# Patient Record
Sex: FEMALE | Race: BLACK OR AFRICAN AMERICAN | NOT HISPANIC OR LATINO | Employment: FULL TIME | URBAN - METROPOLITAN AREA
[De-identification: names, ages, dates, MRNs, and addresses within clinical notes are randomized per-mention and may not be internally consistent; named-entity substitution may affect disease eponyms.]

---

## 2017-01-05 ENCOUNTER — PROCEDURE VISIT (OUTPATIENT)
Dept: OBSTETRICS AND GYNECOLOGY | Facility: CLINIC | Age: 42
End: 2017-01-05
Payer: COMMERCIAL

## 2017-01-05 VITALS — WEIGHT: 207.44 LBS | DIASTOLIC BLOOD PRESSURE: 59 MMHG | SYSTOLIC BLOOD PRESSURE: 110 MMHG | BODY MASS INDEX: 30.2 KG/M2

## 2017-01-05 DIAGNOSIS — R30.0 DYSURIA: Primary | ICD-10-CM

## 2017-01-05 PROCEDURE — 11980 IMPLANT HORMONE PELLET(S): CPT | Mod: S$GLB,,, | Performed by: OBSTETRICS & GYNECOLOGY

## 2017-01-05 PROCEDURE — 87086 URINE CULTURE/COLONY COUNT: CPT

## 2017-01-05 RX ORDER — PHENTERMINE HYDROCHLORIDE 37.5 MG/1
37.5 TABLET ORAL
Qty: 30 TABLET | Refills: 3 | Status: SHIPPED | OUTPATIENT
Start: 2017-01-05 | End: 2017-01-09

## 2017-01-05 NOTE — PROCEDURES
Procedures     Pellet and Injection    Chief Complaint: Hormone Deficiency    History of Present Illness: 41 y.o. female patient presents with menopausal symptoms.    Interval Changes: adequate response to the pellets    Procedure: Subcutaneous hormone pellet implantation with local anesthesia of 10 cc Xylocaine 1%.    Site: Buttocks    Sutures Used: no    Pellets Inserted:  Testosterone 100 mg x 2  Estradiol 50 mg x 2  In the left Buttocks    Intramuscular Injection: None    Follow up with me in 6 months for pellet insertion.    Shannon Fowler MD 01/05/2017 10:08 AM

## 2017-01-05 NOTE — MR AVS SNAPSHOT
[facility] [address]  [address]  [place] [place] [address]  Phone: [phone]  Fax: [phone]                  Bhavana Bright   2017 8:45 AM   Procedure visit    Description:  Female : 1975   Provider:  Shannon Fowler MD   Department:  [facility] [address]           Reason for Visit     Procedure                To Do List           Goals (5 Years of Data)     None      [facility] On Call     [facility] On Call Nurse Care Line -  Assistance  Registered nurses in the [facility] On Call Center provide clinical advisement, health education, appointment booking, and other advisory services.  Call for this free service at [phone].             Medications           Message regarding Medications     Verify the changes and/or additions to your medication regime listed below are the same as discussed with your clinician today.  If any of these changes or additions are incorrect, please notify your healthcare provider.             Verify that the below list of medications is an accurate representation of the medications you are currently taking.  If none reported, the list may be blank. If incorrect, please contact your healthcare provider. Carry this list with you in case of emergency.           Current Medications     estradiol 50 mg pellet Inject 2 Pellet (100 mg total) into the skin as directed.    estradiol 50 mg pellet Inject 2 Pellet (100 mg total) into the skin as directed.    medroxyPROGESTERone (PROVERA) 10 MG tablet Take 1 tablet (10 mg total) by mouth once daily.           Clinical Reference Information           Vital Signs - Last Recorded  Most recent update: 2017  9:18 AM by Roshni Vaughan MA    BP Wt BMI          (!) 110/59 94.1 kg (207 lb 7.3 oz) 30.2 kg/m2        Blood Pressure          Most Recent Value    BP  (!)  110/59      Allergies as of 2017     No Known Allergies      Immunizations Administered on Date of Encounter - 2017     None

## 2017-01-06 LAB
BACTERIA UR CULT: NORMAL
BACTERIA UR CULT: NORMAL

## 2017-01-09 ENCOUNTER — TELEPHONE (OUTPATIENT)
Dept: OBSTETRICS AND GYNECOLOGY | Facility: CLINIC | Age: 42
End: 2017-01-09

## 2017-01-09 RX ORDER — PHENTERMINE HYDROCHLORIDE 37.5 MG/1
37.5 TABLET ORAL
Qty: 30 TABLET | Refills: 3 | Status: SHIPPED | OUTPATIENT
Start: 2017-01-09 | End: 2017-02-08

## 2017-03-09 RX ORDER — MEDROXYPROGESTERONE ACETATE 10 MG/1
TABLET ORAL
Qty: 30 TABLET | Refills: 11 | Status: SHIPPED | OUTPATIENT
Start: 2017-03-09 | End: 2018-03-30 | Stop reason: SDUPTHER

## 2017-05-08 ENCOUNTER — TELEPHONE (OUTPATIENT)
Dept: OBSTETRICS AND GYNECOLOGY | Facility: CLINIC | Age: 42
End: 2017-05-08

## 2017-05-08 NOTE — TELEPHONE ENCOUNTER
----- Message from Emerald Thompson sent at 5/8/2017 11:19 AM CDT -----  Contact: Self  Pt is calling in regards of scheduling a pellet appt. The pt can be reached at [phone]. Thanks KG

## 2017-05-15 ENCOUNTER — TELEPHONE (OUTPATIENT)
Dept: OBSTETRICS AND GYNECOLOGY | Facility: CLINIC | Age: 42
End: 2017-05-15

## 2017-05-15 NOTE — TELEPHONE ENCOUNTER
----- Message from Arina Oconnor sent at 5/15/2017 11:24 AM CDT -----  Contact: Patient  X _1st Request  _  2nd Request  _  3rd Request    Who:DEVIN MCDANIEL [[id number]]    Why:Patient states the pharmacy that was supposed to call her has not called her for pellets and needed to speak with someone     What Number to Call Back:[phone]    When to Expect a call back: (Before the end of the day)   -- if call after 3:00 call back will be tomorrow.

## 2017-05-15 NOTE — TELEPHONE ENCOUNTER
Patient has not been contacted by pharmacy, she has canceled appointment today for insertion.  Patient would like to discuss Pellets and if she can contact pharmacy, directly. Informed will forward message to Dr. Fowler's MA, to assist in obtaining prescription.

## 2017-05-16 NOTE — TELEPHONE ENCOUNTER
Spoke with patient, patient stated that she couldn't come in on June 8th. Patient stated that she can do any other Tuesday or Thursday.I offered her Tuesday 6/27 @ 330 patient denied that time and stated that she needed a late morning appointment. I stated that i will forward this message to ayo to she if she can work her in patient verbalized understanding.

## 2017-05-16 NOTE — TELEPHONE ENCOUNTER
----- Message from Emerald Thompson sent at 5/16/2017 11:24 AM CDT -----  Contact: Self  Pt states that she is unavailable June 8th for her pellet procedure. The pt can be reached at [phone]. The pt can be reached at [phone]. Thanks KG

## 2017-06-27 ENCOUNTER — PROCEDURE VISIT (OUTPATIENT)
Dept: OBSTETRICS AND GYNECOLOGY | Facility: CLINIC | Age: 42
End: 2017-06-27
Payer: COMMERCIAL

## 2017-06-27 VITALS
HEIGHT: 70 IN | SYSTOLIC BLOOD PRESSURE: 112 MMHG | DIASTOLIC BLOOD PRESSURE: 60 MMHG | WEIGHT: 196.44 LBS | BODY MASS INDEX: 28.12 KG/M2

## 2017-06-27 DIAGNOSIS — N95.1 SYMPTOMATIC MENOPAUSAL OR FEMALE CLIMACTERIC STATES: Primary | ICD-10-CM

## 2017-06-27 PROCEDURE — 11980 IMPLANT HORMONE PELLET(S): CPT | Mod: S$GLB,,, | Performed by: OBSTETRICS & GYNECOLOGY

## 2017-06-27 NOTE — PROCEDURES
Procedures     Pellet and Injection    Chief Complaint: Hormone Deficiency    History of Present Illness: 41 y.o. female patient presents with menopausal symptoms.    Interval Changes: adequate response to the pellets    Procedure: Subcutaneous hormone pellet implantation with local anesthesia of 10 cc Xylocaine 1%.    Site: Buttocks    Sutures Used: yes    Pellets Inserted:  Testosterone 100 mg x 2  Estradiol 50 mg x 2  In the left Buttocks    Intramuscular Injection: None    Follow up with me in 6 months for pellet insertion.    Shannon Fowler MD 06/27/2017 11:47 AM

## 2017-09-18 ENCOUNTER — TELEPHONE (OUTPATIENT)
Dept: OBSTETRICS AND GYNECOLOGY | Facility: CLINIC | Age: 42
End: 2017-09-18

## 2017-09-18 DIAGNOSIS — Z12.31 VISIT FOR SCREENING MAMMOGRAM: Primary | ICD-10-CM

## 2017-09-18 NOTE — TELEPHONE ENCOUNTER
----- Message from Cory Guerrero sent at 9/18/2017  3:48 PM CDT -----  Contact: pt  x_ 1st Request   _ 2nd Request   _ 3rd Request     Who: DEVIN MCDANIEL [[id number]]    Why: pt called is requesting orders for mammogram be placed in the system for her.    What Number to Call Back: [phone]    When to Expect a call back: (Before the end of the day)   -- if call after 3:00 call back will be tomorrow.

## 2017-10-03 ENCOUNTER — HOSPITAL ENCOUNTER (OUTPATIENT)
Dept: RADIOLOGY | Facility: OTHER | Age: 42
Discharge: HOME | End: 2017-10-03
Attending: OBSTETRICS & GYNECOLOGY
Payer: COMMERCIAL

## 2017-10-03 DIAGNOSIS — Z12.31 VISIT FOR SCREENING MAMMOGRAM: ICD-10-CM

## 2017-10-03 PROCEDURE — 77067 SCR MAMMO BI INCL CAD: CPT | Mod: TC

## 2017-10-03 PROCEDURE — 77063 BREAST TOMOSYNTHESIS BI: CPT | Mod: 26,,, | Performed by: RADIOLOGY

## 2017-10-03 PROCEDURE — 77067 SCR MAMMO BI INCL CAD: CPT | Mod: 26,,, | Performed by: RADIOLOGY

## 2017-11-06 ENCOUNTER — TELEPHONE (OUTPATIENT)
Dept: OBSTETRICS AND GYNECOLOGY | Facility: CLINIC | Age: 42
End: 2017-11-06

## 2017-11-06 NOTE — TELEPHONE ENCOUNTER
----- Message from Karen Villalta sent at 11/6/2017  2:29 PM CST -----  Contact: self  _x  1st Request  _  2nd Request  _  3rd Request    Who: pt    Why: pt returning call.. Please advise    What Number to Call Back: [phone]  When to Expect a call back: (Before the end of the day)   -- if call after 3:00 call back will be tomorrow.

## 2017-11-06 NOTE — TELEPHONE ENCOUNTER
----- Message from Annette Tovar sent at 11/6/2017 12:24 PM CST -----  Contact: DEVIN MCDANIEL [[id number]]  x_  1st Request  _  2nd Request  _  3rd Request        Who: DEVIN MCDANIEL [[id number]]    Why: Requesting a call back in regards to scheduling a procedure. Please call her.     What Number to Call Back: [phone]    When to Expect a call back: (Within 24 hours)    Please return the call at earliest convenience. Thanks!

## 2017-11-22 ENCOUNTER — OFFICE VISIT (OUTPATIENT)
Dept: OPTOMETRY | Facility: CLINIC | Age: 42
End: 2017-11-22
Payer: COMMERCIAL

## 2017-11-22 DIAGNOSIS — H52.7 REFRACTIVE ERROR: ICD-10-CM

## 2017-11-22 DIAGNOSIS — Z01.00 EXAMINATION OF EYES AND VISION: Primary | ICD-10-CM

## 2017-11-22 PROCEDURE — 92015 DETERMINE REFRACTIVE STATE: CPT | Mod: S$GLB,,, | Performed by: OPTOMETRIST

## 2017-11-22 PROCEDURE — 99999 PR PBB SHADOW E&M-EST. PATIENT-LVL I: CPT | Mod: PBBFAC,,, | Performed by: OPTOMETRIST

## 2017-11-22 PROCEDURE — 92004 COMPRE OPH EXAM NEW PT 1/>: CPT | Mod: S$GLB,,, | Performed by: OPTOMETRIST

## 2017-11-22 NOTE — PROGRESS NOTES
Subjective:       Patient ID: Bhavana Bright is a 42 y.o. female      Chief Complaint   Patient presents with    Concerns About Ocular Health     History of Present Illness  Dls: ? 4 yrs     Pt c/o blurry vision at near ou. Pt does not wear any glasses. Pt states   no tearing no itching no burning no pain no ha's no floaters.     Eye meds:  None     Assessment/Plan:     1. Examination of eyes and vision  Good ocular health OU    2. Refractive error  Educated patient on refractive error and discussed lens options. Can try OTC readers if preferred. Dispensed updated spectacle Rx. Educated about adaptation period to new specs.    Eyeglass Final Rx     Eyeglass Final Rx       Sphere Cylinder    Right +1.50 Sphere    Left +1.50 Sphere    Type:  SVL near only    Expiration Date:  11/23/2018                  No Follow-up on file.

## 2017-11-28 ENCOUNTER — TELEPHONE (OUTPATIENT)
Dept: OBSTETRICS AND GYNECOLOGY | Facility: CLINIC | Age: 42
End: 2017-11-28

## 2017-11-28 NOTE — TELEPHONE ENCOUNTER
----- Message from Marga Silveira sent at 11/28/2017  8:45 AM CST -----  Contact: self  Pt needing a call back, regarding her appt Friday, she can be reached at [phone].

## 2017-12-01 ENCOUNTER — TELEPHONE (OUTPATIENT)
Dept: OBSTETRICS AND GYNECOLOGY | Facility: CLINIC | Age: 42
End: 2017-12-01

## 2017-12-01 NOTE — TELEPHONE ENCOUNTER
----- Message from Ayshatushar Johnson sent at 12/1/2017  9:09 AM CST -----  Contact: DEVIN MCDANIEL [[id number]]  x_  1st Request  _  2nd Request  _  3rd Request        Who: DEVIN MCDANIEL [[id number]]    Why: patient states her pellets did not come in and would like a call back to reschedule her procedure    What Number to Call Back: [phone]    When to Expect a call back: (Before the end of the day)   -- if call after 3:00 call back will be tomorrow.

## 2017-12-01 NOTE — TELEPHONE ENCOUNTER
Pt spoke to pellet pharmacy and got info for tracking and was unable to get pellets today as something happened in [place] and they became delayed. Rescheduled to Tuesday morning.

## 2017-12-05 ENCOUNTER — PROCEDURE VISIT (OUTPATIENT)
Dept: OBSTETRICS AND GYNECOLOGY | Facility: CLINIC | Age: 42
End: 2017-12-05
Payer: COMMERCIAL

## 2017-12-05 VITALS — HEIGHT: 70 IN | BODY MASS INDEX: 28.8 KG/M2 | WEIGHT: 201.19 LBS

## 2017-12-05 DIAGNOSIS — N95.1 SYMPTOMATIC MENOPAUSAL OR FEMALE CLIMACTERIC STATES: Primary | ICD-10-CM

## 2017-12-05 PROCEDURE — 11980 IMPLANT HORMONE PELLET(S): CPT | Mod: S$GLB,,, | Performed by: OBSTETRICS & GYNECOLOGY

## 2017-12-05 NOTE — PROCEDURES
Procedures     Pellet and Injection    Chief Complaint: Hormone Deficiency    History of Present Illness: 42 y.o. female patient presents with menopausal symptoms.    Interval Changes: adequate response to the pellets    Procedure: Subcutaneous hormone pellet implantation with local anesthesia of 10 cc Xylocaine 1%.    Site: Buttocks    Sutures Used: yes    Pellets Inserted:  Testosterone 100 mg x 2  Estradiol 50 mg x 2  In the left Buttocks    Intramuscular Injection: None    Follow up with me in 6 months for pellet insertion.    Shannon Fowler MD 12/05/2017 12:00 PM

## 2018-01-03 ENCOUNTER — TELEPHONE (OUTPATIENT)
Dept: OBSTETRICS AND GYNECOLOGY | Facility: CLINIC | Age: 43
End: 2018-01-03

## 2018-01-03 DIAGNOSIS — E28.39 PREMATURE OVARIAN FAILURE: Primary | ICD-10-CM

## 2018-04-03 RX ORDER — MEDROXYPROGESTERONE ACETATE 10 MG/1
TABLET ORAL
Qty: 90 TABLET | Refills: 3 | Status: SHIPPED | OUTPATIENT
Start: 2018-04-03 | End: 2019-06-14 | Stop reason: SDUPTHER

## 2018-04-16 ENCOUNTER — TELEPHONE (OUTPATIENT)
Dept: OBSTETRICS AND GYNECOLOGY | Facility: CLINIC | Age: 43
End: 2018-04-16

## 2018-04-16 DIAGNOSIS — R53.83 FATIGUE, UNSPECIFIED TYPE: Primary | ICD-10-CM

## 2018-04-16 NOTE — TELEPHONE ENCOUNTER
----- Message from Jey Olivo MA sent at 4/16/2018 11:12 AM CDT -----  Contact: Self  Pt states that she is needing a appt for pellets.  The pt can be reached at [phone].  Thanks FL

## 2018-04-17 ENCOUNTER — LAB VISIT (OUTPATIENT)
Dept: LAB | Facility: OTHER | Age: 43
End: 2018-04-17
Attending: OBSTETRICS & GYNECOLOGY
Payer: COMMERCIAL

## 2018-04-17 DIAGNOSIS — R53.83 FATIGUE, UNSPECIFIED TYPE: ICD-10-CM

## 2018-04-17 LAB
ESTRADIOL SERPL-MCNC: 196 PG/ML
TESTOST SERPL-MCNC: 53 NG/DL

## 2018-04-17 PROCEDURE — 36415 COLL VENOUS BLD VENIPUNCTURE: CPT

## 2018-04-17 PROCEDURE — 82670 ASSAY OF TOTAL ESTRADIOL: CPT

## 2018-04-17 PROCEDURE — 84402 ASSAY OF FREE TESTOSTERONE: CPT

## 2018-04-17 PROCEDURE — 84403 ASSAY OF TOTAL TESTOSTERONE: CPT

## 2018-04-20 LAB — TESTOST FREE SERPL-MCNC: 1.3 PG/ML

## 2018-04-23 ENCOUNTER — TELEPHONE (OUTPATIENT)
Dept: OBSTETRICS AND GYNECOLOGY | Facility: CLINIC | Age: 43
End: 2018-04-23

## 2018-04-23 DIAGNOSIS — R53.83 FATIGUE, UNSPECIFIED TYPE: Primary | ICD-10-CM

## 2018-04-23 NOTE — TELEPHONE ENCOUNTER
----- Message from Pamela Phipps sent at 4/23/2018  1:53 PM CDT -----  Contact: DEVIN MCDANIEL [[id number]]            Name of Who is Calling: DEVIN MCDANIEL [[id number]]      What is the request in detail: calling in regards to test results and moving forward with pellet inj, pt requesting call back       Can the clinic reply by [facility]: no      [facility]: [phone]

## 2018-09-24 ENCOUNTER — TELEPHONE (OUTPATIENT)
Dept: OBSTETRICS AND GYNECOLOGY | Facility: CLINIC | Age: 43
End: 2018-09-24

## 2018-09-24 DIAGNOSIS — Z12.31 VISIT FOR SCREENING MAMMOGRAM: Primary | ICD-10-CM

## 2018-09-24 NOTE — TELEPHONE ENCOUNTER
----- Message from Arina Oconnor sent at 9/24/2018  2:58 PM CDT -----  Contact: jodee  Name of Who is Calling: jodee      What is the request in detail: Patient is requesting orders for her annual mammogram       Can the clinic reply by [facility]: no      What Number to Call Back if not in [facility]: [phone]

## 2018-10-01 ENCOUNTER — TELEPHONE (OUTPATIENT)
Dept: OBSTETRICS AND GYNECOLOGY | Facility: CLINIC | Age: 43
End: 2018-10-01

## 2018-10-01 NOTE — TELEPHONE ENCOUNTER
----- Message from Sarah Medina sent at 10/1/2018 12:09 PM CDT -----  Contact: Pt   Name of Who is Calling:  DEVIN MCDANIEL [[id number]]    What is the request in detail: Pt would like to speak to the nurse in regards to scheduling Pallet procedure. Please advise.    Can the clinic reply by [facility]: No     What Number to Call Back if not in [facility]: [phone]

## 2018-10-09 ENCOUNTER — HOSPITAL ENCOUNTER (OUTPATIENT)
Dept: RADIOLOGY | Facility: OTHER | Age: 43
Discharge: HOME | End: 2018-10-09
Attending: OBSTETRICS & GYNECOLOGY
Payer: COMMERCIAL

## 2018-10-09 DIAGNOSIS — Z12.31 VISIT FOR SCREENING MAMMOGRAM: ICD-10-CM

## 2018-10-09 PROCEDURE — 77067 SCR MAMMO BI INCL CAD: CPT | Mod: 26,,, | Performed by: RADIOLOGY

## 2018-10-09 PROCEDURE — 77063 BREAST TOMOSYNTHESIS BI: CPT | Mod: TC

## 2018-10-09 PROCEDURE — 77067 SCR MAMMO BI INCL CAD: CPT | Mod: TC

## 2018-10-09 PROCEDURE — 77063 BREAST TOMOSYNTHESIS BI: CPT | Mod: 26,,, | Performed by: RADIOLOGY

## 2018-10-15 ENCOUNTER — TELEPHONE (OUTPATIENT)
Dept: OBSTETRICS AND GYNECOLOGY | Facility: CLINIC | Age: 43
End: 2018-10-15

## 2018-10-15 NOTE — TELEPHONE ENCOUNTER
----- Message from Faheem Olmstead sent at 10/15/2018  8:20 AM CDT -----  Contact: Bhavana Bright            Name of Who is Calling: Bhavana Bright      What is the request in detail: Patient needs to schedule pellet procedure      Can the clinic reply by [facility]: No      What Number to Call Back if not in [facility]: [phone]

## 2018-11-06 ENCOUNTER — PROCEDURE VISIT (OUTPATIENT)
Dept: OBSTETRICS AND GYNECOLOGY | Facility: CLINIC | Age: 43
End: 2018-11-06

## 2018-11-06 DIAGNOSIS — M85.80 OSTEOPENIA, UNSPECIFIED LOCATION: ICD-10-CM

## 2018-11-06 DIAGNOSIS — E28.39 PREMATURE OVARIAN FAILURE: ICD-10-CM

## 2018-11-06 PROCEDURE — 11980 IMPLANT HORMONE PELLET(S): CPT | Mod: CSM,S$GLB,, | Performed by: OBSTETRICS & GYNECOLOGY

## 2018-11-06 NOTE — PROCEDURES
Pellet and Injection    Chief Complaint: Hormone Deficiency    History of Present Illness: 43 y.o. female patient presents with menopausal symptoms.    Interval Changes: adequate response to the pellets    Procedure: Subcutaneous hormone pellet implantation with local anesthesia of 10 cc Xylocaine 1%.    Site: Buttocks    Sutures Used: yes    Pellets Inserted:  Testosterone 100 mg x 2  Estradiol 50 mg x 2  In the right Buttocks    Intramuscular Injection: None    Follow up with me in 6 months for pellet insertion.    Shannon Fowler MD 11/06/2018 8:38 AM

## 2018-12-26 ENCOUNTER — TELEPHONE (OUTPATIENT)
Dept: GASTROENTEROLOGY | Facility: CLINIC | Age: 43
End: 2018-12-26

## 2018-12-26 NOTE — TELEPHONE ENCOUNTER
Attempted to contact patient about scheduling an appointment. Left patient a message to give office a call back.

## 2018-12-26 NOTE — TELEPHONE ENCOUNTER
----- Message from Farrah Pineda sent at 12/24/2018 11:55 AM CST -----  Contact: self [phone]  New patient would like to speak with you about scheduling an appointment for stomach pain. Please advise.

## 2019-02-25 ENCOUNTER — TELEPHONE (OUTPATIENT)
Dept: OBSTETRICS AND GYNECOLOGY | Facility: CLINIC | Age: 44
End: 2019-02-25

## 2019-02-25 DIAGNOSIS — N95.1 MENOPAUSAL SYMPTOM: Primary | ICD-10-CM

## 2019-02-25 NOTE — TELEPHONE ENCOUNTER
----- Message from Brit Celaya sent at 2/25/2019  2:37 PM CST -----  Contact: Patient   Patient called to schedule annual exam and pellet procedure. The patient can be reached at [phone].

## 2019-02-25 NOTE — TELEPHONE ENCOUNTER
----- Message from Brit Celaya sent at 2/25/2019  2:37 PM CST -----  Contact: Patient   Patient called to schedule annual exam and pellet procedure. The patient can be reached at [phone].

## 2019-04-04 ENCOUNTER — OFFICE VISIT (OUTPATIENT)
Dept: OBSTETRICS AND GYNECOLOGY | Facility: CLINIC | Age: 44
End: 2019-04-04
Payer: COMMERCIAL

## 2019-04-04 ENCOUNTER — LAB VISIT (OUTPATIENT)
Dept: LAB | Facility: OTHER | Age: 44
End: 2019-04-04
Attending: OBSTETRICS & GYNECOLOGY
Payer: COMMERCIAL

## 2019-04-04 VITALS
WEIGHT: 215.19 LBS | HEIGHT: 70 IN | DIASTOLIC BLOOD PRESSURE: 60 MMHG | SYSTOLIC BLOOD PRESSURE: 116 MMHG | BODY MASS INDEX: 30.81 KG/M2

## 2019-04-04 DIAGNOSIS — Z12.4 PAP SMEAR FOR CERVICAL CANCER SCREENING: ICD-10-CM

## 2019-04-04 DIAGNOSIS — Z11.51 SCREENING FOR HPV (HUMAN PAPILLOMAVIRUS): ICD-10-CM

## 2019-04-04 DIAGNOSIS — Z01.419 ENCOUNTER FOR GYNECOLOGICAL EXAMINATION: Primary | ICD-10-CM

## 2019-04-04 DIAGNOSIS — Z12.11 SCREENING FOR COLORECTAL CANCER: ICD-10-CM

## 2019-04-04 DIAGNOSIS — N95.1 MENOPAUSAL SYMPTOM: ICD-10-CM

## 2019-04-04 DIAGNOSIS — Z12.12 SCREENING FOR COLORECTAL CANCER: ICD-10-CM

## 2019-04-04 LAB
ESTRADIOL SERPL-MCNC: 161 PG/ML
TESTOST SERPL-MCNC: 19 NG/DL (ref 5–73)

## 2019-04-04 PROCEDURE — 87624 HPV HI-RISK TYP POOLED RSLT: CPT

## 2019-04-04 PROCEDURE — 99386 PREV VISIT NEW AGE 40-64: CPT | Mod: S$GLB,,, | Performed by: OBSTETRICS & GYNECOLOGY

## 2019-04-04 PROCEDURE — 82670 ASSAY OF TOTAL ESTRADIOL: CPT

## 2019-04-04 PROCEDURE — 99386 PR PREVENTIVE VISIT,NEW,40-64: ICD-10-PCS | Mod: S$GLB,,, | Performed by: OBSTETRICS & GYNECOLOGY

## 2019-04-04 PROCEDURE — 84402 ASSAY OF FREE TESTOSTERONE: CPT

## 2019-04-04 PROCEDURE — 99999 PR PBB SHADOW E&M-EST. PATIENT-LVL III: ICD-10-PCS | Mod: PBBFAC,,, | Performed by: OBSTETRICS & GYNECOLOGY

## 2019-04-04 PROCEDURE — 99999 PR PBB SHADOW E&M-EST. PATIENT-LVL III: CPT | Mod: PBBFAC,,, | Performed by: OBSTETRICS & GYNECOLOGY

## 2019-04-04 PROCEDURE — 84403 ASSAY OF TOTAL TESTOSTERONE: CPT

## 2019-04-04 PROCEDURE — 88175 CYTOPATH C/V AUTO FLUID REDO: CPT

## 2019-04-04 NOTE — PROGRESS NOTES
HPI: Pt is a 43 y.o.  female who presents for routine annual exam. She has POF and is on pellet therapy for hormone replacement.      MMG 10/19/18 NORMAL.       ROS:  GENERAL: Feeling well overall. Denies fever or chills.   SKIN: Denies rash or lesions.   HEAD: Denies head injury or headache.   NODES: Denies enlarged lymph nodes.   CHEST: Denies chest pain or shortness of breath.   CARDIOVASCULAR: Denies palpitations or left sided chest pain.   ABDOMEN: No abdominal pain, constipation, diarrhea, nausea, vomiting or rectal bleeding.   URINARY: No dysuria, hematuria, or burning on urination.  REPRODUCTIVE: See HPI.   BREASTS: Denies pain, lumps, or nipple discharge.   HEMATOLOGIC: No easy bruisability or excessive bleeding.   MUSCULOSKELETAL: Denies joint pain or swelling.   NEUROLOGIC: Denies syncope or weakness.   PSYCHIATRIC: Denies depression, anxiety or mood swings.    PE:   APPEARANCE: Well nourished, well developed, Black or  female in no acute distress.  NODES: no cervical, supraclavicular, or inguinal lymphadenopathy  BREASTS: Symmetrical, no skin changes or visible lesions. No palpable masses, nipple discharge or adenopathy bilaterally.  ABDOMEN: Soft. No tenderness or masses. No distention. No hernias palpated. No CVA tenderness.  VULVA: No lesions. Normal external female genitalia.  URETHRAL MEATUS: Normal size and location, no lesions, no prolapse.  URETHRA: No masses, tenderness, or prolapse.  VAGINA: Moist. No lesions or lacerations noted. No abnormal discharge present. No odor present.   CERVIX: No lesions or discharge. No cervical motion tenderness.   UTERUS: Normal size, regular shape, mobile, non-tender.  ADNEXA: No tenderness. No fullness or masses palpated in the adnexal regions.   ANUS PERINEUM: Normal.      Diagnosis:  1. Encounter for gynecological examination    2. Pap smear for cervical cancer screening    3. Screening for colorectal cancer    4. Screening for HPV (human  papillomavirus)        Plan:     Orders Placed This Encounter    HPV High Risk Genotypes, PCR    Liquid-based pap smear, screening    Case request GI: COLONOSCOPY       Patient was counseled today on the new ACS guidelines for cervical cytology screening as well as the current recommendations for breast cancer screening. She was counseled to follow up with her PCP for other routine health maintenance. Counseling session lasted approximately 10 minutes, and all her questions were answered.    Follow-up with me in 1 year for routine exam; cotesting in 5 years if today's normal.

## 2019-04-08 DIAGNOSIS — Z12.11 SPECIAL SCREENING FOR MALIGNANT NEOPLASMS, COLON: Primary | ICD-10-CM

## 2019-04-08 LAB — TESTOST FREE SERPL-MCNC: 1 PG/ML

## 2019-04-08 RX ORDER — POLYETHYLENE GLYCOL 3350, SODIUM SULFATE ANHYDROUS, SODIUM BICARBONATE, SODIUM CHLORIDE, POTASSIUM CHLORIDE 236; 22.74; 6.74; 5.86; 2.97 G/4L; G/4L; G/4L; G/4L; G/4L
4 POWDER, FOR SOLUTION ORAL ONCE
Qty: 4000 ML | Refills: 0 | Status: SHIPPED | OUTPATIENT
Start: 2019-04-08 | End: 2019-04-08

## 2019-04-10 LAB
HPV HR 12 DNA CVX QL NAA+PROBE: NEGATIVE
HPV16 AG SPEC QL: POSITIVE
HPV18 DNA SPEC QL NAA+PROBE: NEGATIVE

## 2019-04-16 ENCOUNTER — PATIENT MESSAGE (OUTPATIENT)
Dept: OBSTETRICS AND GYNECOLOGY | Facility: CLINIC | Age: 44
End: 2019-04-16

## 2019-05-07 ENCOUNTER — PROCEDURE VISIT (OUTPATIENT)
Dept: OBSTETRICS AND GYNECOLOGY | Facility: CLINIC | Age: 44
End: 2019-05-07

## 2019-05-07 DIAGNOSIS — N95.1 SYMPTOMATIC MENOPAUSAL OR FEMALE CLIMACTERIC STATES: Primary | ICD-10-CM

## 2019-05-07 PROCEDURE — 11980 PR IMPLANT,HORMONE,SUBCUTANEOUS: ICD-10-PCS | Mod: CSM,S$GLB,, | Performed by: OBSTETRICS & GYNECOLOGY

## 2019-05-07 PROCEDURE — 11980 IMPLANT HORMONE PELLET(S): CPT | Mod: CSM,S$GLB,, | Performed by: OBSTETRICS & GYNECOLOGY

## 2019-05-07 RX ORDER — PHENTERMINE HYDROCHLORIDE 37.5 MG/1
37.5 TABLET ORAL
Qty: 30 TABLET | Refills: 3 | Status: SHIPPED | OUTPATIENT
Start: 2019-05-07 | End: 2019-06-06

## 2019-05-07 NOTE — PROCEDURES
Procedures     Pellet and Injection    Chief Complaint: Hormone Deficiency    History of Present Illness: 43 y.o. female patient presents with menopausal symptoms.    Interval Changes: adequate response to the pellets    Procedure: Subcutaneous hormone pellet implantation with local anesthesia of 10 cc Xylocaine 1%.    Site: Buttocks    Sutures Used: yes    Pellets Inserted:  Testosterone 100 mg x 2  Estradiol 50 mg x 2  In the left Buttocks    Intramuscular Injection: None    Follow up with me in 6 months for pellet insertion.    Shannon Fowler MD 05/07/2019 8:46 AM

## 2019-05-08 ENCOUNTER — TELEPHONE (OUTPATIENT)
Dept: SURGERY | Facility: CLINIC | Age: 44
End: 2019-05-08

## 2019-05-08 NOTE — TELEPHONE ENCOUNTER
----- Message from Aleah Marcelino sent at 5/8/2019  9:09 AM CDT -----  Contact: Patient   Patient would like to cancel her procedure that is on 05/10/2019    [phone]    Thank you

## 2019-06-06 ENCOUNTER — PROCEDURE VISIT (OUTPATIENT)
Dept: OBSTETRICS AND GYNECOLOGY | Facility: CLINIC | Age: 44
End: 2019-06-06
Payer: COMMERCIAL

## 2019-06-06 VITALS — HEIGHT: 70 IN | WEIGHT: 215 LBS | BODY MASS INDEX: 30.78 KG/M2

## 2019-06-06 DIAGNOSIS — R87.810 CERVICAL HIGH RISK HPV (HUMAN PAPILLOMAVIRUS) TEST POSITIVE: Primary | ICD-10-CM

## 2019-06-06 PROCEDURE — 57454 BX/CURETT OF CERVIX W/SCOPE: CPT | Mod: S$GLB,,, | Performed by: OBSTETRICS & GYNECOLOGY

## 2019-06-06 PROCEDURE — 57454 PR COLPOSC,CERVIX W/ADJ VAG,W/BX & CURRETAG: ICD-10-PCS | Mod: S$GLB,,, | Performed by: OBSTETRICS & GYNECOLOGY

## 2019-06-06 NOTE — PROCEDURES
Procedures     COLPOSCOPY:    Bhavana Bright is a 43 y.o. female presents today for colposcopy.  No LMP recorded. Patient has had an implant..  Her most recent pap smear shows NILM, (+) HPV 18.      The abnormal test findings were discussed, as well as HPV infection, need for colposcopy and possible biopsies to determine the plan of care, treatments available, the minimal risk of bleeding and infection with colposcopy, and alternatives to colposcopy and she agrees to proceed.      UPT is negative    COLPOSCOPY EXAM:   TIME OUT PERFORMED.     no visible lesions    Biopsy was NOT taken    ECC was not performed      Hemostasis was adequate with application of Monsel's solution.  The speculum was removed.    The patient did tolerate the procedure well.    Impression: normal    All collected specimens sent to pathology for histologic analysis.    Post-colposcopy counseling:  The patient was instructed to manage post-colposcopy cramping with NSAIDs or Tylenol, or with a prescription per the medication card.  Avoid intercourse, douching, or tampons in the vagina for at least 2-3 days.  Expect a clumpy blackish discharge due to Monsel's solution application for several days.  Report heavy bleeding, worsening pain or pain that does not respond to above medications, or foul-smelling vaginal discharge.     HPV vaccine recommended according to FDA age guidelines.    Follow up in 6 months for repeat pap smear or sooner based on colposcopy results.

## 2019-06-14 RX ORDER — MEDROXYPROGESTERONE ACETATE 10 MG/1
TABLET ORAL
Qty: 90 TABLET | Refills: 0 | Status: SHIPPED | OUTPATIENT
Start: 2019-06-14 | End: 2019-09-09 | Stop reason: SDUPTHER

## 2019-09-09 RX ORDER — MEDROXYPROGESTERONE ACETATE 10 MG/1
TABLET ORAL
Qty: 90 TABLET | Refills: 0 | Status: SHIPPED | OUTPATIENT
Start: 2019-09-09 | End: 2019-12-06 | Stop reason: SDUPTHER

## 2019-09-20 ENCOUNTER — TELEPHONE (OUTPATIENT)
Dept: OBSTETRICS AND GYNECOLOGY | Facility: CLINIC | Age: 44
End: 2019-09-20

## 2019-09-20 NOTE — TELEPHONE ENCOUNTER
Spoke with patient whom stated she was last seen in June. She complained of bleeding and was told if this continues to call for appointment (possible surgery). I attempted to schedule for pre op (unsuccessful). Informed her staff will call on Monday for scheduling.

## 2019-09-20 NOTE — TELEPHONE ENCOUNTER
----- Message from Belinda Smith sent at 9/20/2019  8:44 AM CDT -----  Contact: DEVIN MCDANIEL [[id number]]  Name of Who is Calling: DEVIN MCDANIEL [[id number]]    What is the request in detail: Would like to speak with provider in regards to irregular bleeding. Please contact to further discuss and advise      Can the clinic reply by [facility]: no    [facility]: [phone]

## 2019-09-25 ENCOUNTER — TELEPHONE (OUTPATIENT)
Dept: OBSTETRICS AND GYNECOLOGY | Facility: CLINIC | Age: 44
End: 2019-09-25

## 2019-09-25 NOTE — TELEPHONE ENCOUNTER
----- Message from Brit Celaya sent at 9/25/2019  8:11 AM CDT -----  Contact: Patient  Patient called as a follow-up to her previous calls regarding intermittent bleeding for the past and a half.The patient was advised by the provider if this was to occur again to call and inform her of it so that she can address the issue accordingly. The patient can be reached at  [phone].

## 2019-09-25 NOTE — TELEPHONE ENCOUNTER
Pt states that she was told to call as Dr Fowler wanted to book a hyst if the bleeding continued and she states that it has and she is wanting to talk about it more. Pt booked on 10/3 for consult

## 2019-10-03 ENCOUNTER — OFFICE VISIT (OUTPATIENT)
Dept: OBSTETRICS AND GYNECOLOGY | Facility: CLINIC | Age: 44
End: 2019-10-03
Payer: COMMERCIAL

## 2019-10-03 DIAGNOSIS — N95.1 SYMPTOMATIC MENOPAUSAL OR FEMALE CLIMACTERIC STATES: ICD-10-CM

## 2019-10-03 DIAGNOSIS — R10.2 PELVIC PAIN IN FEMALE: Primary | ICD-10-CM

## 2019-10-03 DIAGNOSIS — Z12.39 BREAST CANCER SCREENING: ICD-10-CM

## 2019-10-03 DIAGNOSIS — Z13.220 SCREENING CHOLESTEROL LEVEL: ICD-10-CM

## 2019-10-03 DIAGNOSIS — D25.1 INTRAMURAL LEIOMYOMA OF UTERUS: ICD-10-CM

## 2019-10-03 PROCEDURE — 99999 PR PBB SHADOW E&M-EST. PATIENT-LVL II: CPT | Mod: PBBFAC,,, | Performed by: OBSTETRICS & GYNECOLOGY

## 2019-10-03 PROCEDURE — 99999 PR PBB SHADOW E&M-EST. PATIENT-LVL II: ICD-10-PCS | Mod: PBBFAC,,, | Performed by: OBSTETRICS & GYNECOLOGY

## 2019-10-03 PROCEDURE — 99213 PR OFFICE/OUTPT VISIT, EST, LEVL III, 20-29 MIN: ICD-10-PCS | Mod: S$GLB,,, | Performed by: OBSTETRICS & GYNECOLOGY

## 2019-10-03 PROCEDURE — 99213 OFFICE O/P EST LOW 20 MIN: CPT | Mod: S$GLB,,, | Performed by: OBSTETRICS & GYNECOLOGY

## 2019-10-03 NOTE — PROGRESS NOTES
HPI: Pt presents today to discuss surgical options for pelvic pain and continued vaginal bleeding. She has hx of premature ovarian failure and has been on HRT with estradiol/testosterone injections and doing well. She takes progesterone orally. Still reports having a lot of spotting and lower abdominal pain and cramping. She is interested in surgical management as she does not want to have to worry about bleeding all the time with her hormones.     ROS:  GENERAL: Feeling well overall. Denies fever or chills.   SKIN: Denies rash or lesions.   HEAD: Denies head injury or headache.   NODES: Denies enlarged lymph nodes.   CHEST: Denies chest pain or shortness of breath.   CARDIOVASCULAR: Denies palpitations or left sided chest pain.   ABDOMEN: No abdominal pain, constipation, diarrhea, nausea, vomiting or rectal bleeding.   URINARY: No dysuria, hematuria, or burning on urination.  REPRODUCTIVE: See HPI.   BREASTS: Denies pain, lumps, or nipple discharge.   HEMATOLOGIC: No easy bruisability or excessive bleeding.   MUSCULOSKELETAL: Denies joint pain or swelling.   NEUROLOGIC: Denies syncope or weakness.   PSYCHIATRIC: Denies depression, anxiety or mood swings.    PE:   APPEARANCE: Well nourished, well developed, Black or  female in no acute distress.  ABDOMEN: Soft. No tenderness or masses. No distention. No hernias palpated. No CVA tenderness.  PELVIC: deferred. Counseling session only.     Diagnosis:  1. Pelvic pain in female    2. Symptomatic menopausal or female climacteric states    3. Screening cholesterol level    4. Breast cancer screening    5. Intramural leiomyoma of uterus        Plan:     Orders Placed This Encounter    US Pelvis Comp with Transvag NON-OB (xpd    Mammo Digital Screening Bilat w/ Sanjay    Estradiol    Testosterone    Testosterone, free    Lipid panel    Case Request Operating Room: HYSTERECTOMY, TOTAL, LAPAROSCOPIC, SALPINGO-OOPHORECTOMY, LAPAROSCOPIC, CYSTOSCOPY     We  discussed the bleeding and causes and the various treatment options:  1) No treatment  2) Medical treatment ( Mirena IUD)  3)  Surgical tretament:  D&C, hysteroscopy, endometrial ablation, hysterectomy. The patient expressed understanding of her treatment options and all questions were answered. After informed counseling, she has decided to proceed with TLH/BSO --> scheduled for 12/20/19.     Follow-up with me on 12/10/19 for preop/possible Embx

## 2019-10-10 ENCOUNTER — HOSPITAL ENCOUNTER (OUTPATIENT)
Dept: RADIOLOGY | Facility: OTHER | Age: 44
Discharge: HOME | End: 2019-10-10
Attending: OBSTETRICS & GYNECOLOGY
Payer: COMMERCIAL

## 2019-10-10 DIAGNOSIS — R10.2 PELVIC PAIN IN FEMALE: ICD-10-CM

## 2019-10-10 DIAGNOSIS — Z12.39 BREAST CANCER SCREENING: ICD-10-CM

## 2019-10-10 PROCEDURE — 76856 US PELVIS COMP WITH TRANSVAG NON-OB (XPD): ICD-10-PCS | Mod: 26,,, | Performed by: RADIOLOGY

## 2019-10-10 PROCEDURE — 76830 TRANSVAGINAL US NON-OB: CPT | Mod: 26,,, | Performed by: RADIOLOGY

## 2019-10-10 PROCEDURE — 76830 US PELVIS COMP WITH TRANSVAG NON-OB (XPD): ICD-10-PCS | Mod: 26,,, | Performed by: RADIOLOGY

## 2019-10-10 PROCEDURE — 76856 US EXAM PELVIC COMPLETE: CPT | Mod: 26,,, | Performed by: RADIOLOGY

## 2019-10-10 PROCEDURE — 76830 TRANSVAGINAL US NON-OB: CPT | Mod: TC

## 2019-10-18 ENCOUNTER — PATIENT MESSAGE (OUTPATIENT)
Dept: OBSTETRICS AND GYNECOLOGY | Facility: CLINIC | Age: 44
End: 2019-10-18

## 2019-10-29 ENCOUNTER — HOSPITAL ENCOUNTER (OUTPATIENT)
Dept: RADIOLOGY | Facility: OTHER | Age: 44
Discharge: HOME | End: 2019-10-29
Attending: OBSTETRICS & GYNECOLOGY
Payer: COMMERCIAL

## 2019-10-29 VITALS — WEIGHT: 214.94 LBS | BODY MASS INDEX: 30.77 KG/M2 | HEIGHT: 70 IN

## 2019-10-29 PROCEDURE — 77063 MAMMO DIGITAL SCREENING BILAT WITH TOMOSYNTHESIS_CAD: ICD-10-PCS | Mod: 26,,, | Performed by: RADIOLOGY

## 2019-10-29 PROCEDURE — 77063 BREAST TOMOSYNTHESIS BI: CPT | Mod: 26,,, | Performed by: RADIOLOGY

## 2019-10-29 PROCEDURE — 77067 MAMMO DIGITAL SCREENING BILAT WITH TOMOSYNTHESIS_CAD: ICD-10-PCS | Mod: 26,,, | Performed by: RADIOLOGY

## 2019-10-29 PROCEDURE — 77067 SCR MAMMO BI INCL CAD: CPT | Mod: 26,,, | Performed by: RADIOLOGY

## 2019-10-29 PROCEDURE — 77067 SCR MAMMO BI INCL CAD: CPT | Mod: TC

## 2019-11-26 ENCOUNTER — TELEPHONE (OUTPATIENT)
Dept: OBSTETRICS AND GYNECOLOGY | Facility: CLINIC | Age: 44
End: 2019-11-26

## 2019-11-26 NOTE — TELEPHONE ENCOUNTER
----- Message from Neha Chacko sent at 11/26/2019 10:59 AM CST -----  Contact: DEVIN MCDANIEL [[id number]]  Name of Who is Calling : DEVIN MCDANIEL [[id number]]    Patient is returning a call from staff no further details given   .....Please contact to further discuss and advise.    Can the clinic reply by [facility] : Yes    What Number to Call Back :  [phone] or [phone] (Work #)

## 2019-12-06 RX ORDER — MEDROXYPROGESTERONE ACETATE 10 MG/1
TABLET ORAL
Qty: 90 TABLET | Refills: 0 | Status: ON HOLD | OUTPATIENT
Start: 2019-12-06 | End: 2019-12-21 | Stop reason: HOSPADM

## 2019-12-09 ENCOUNTER — OFFICE VISIT (OUTPATIENT)
Dept: OBSTETRICS AND GYNECOLOGY | Facility: CLINIC | Age: 44
End: 2019-12-09
Payer: COMMERCIAL

## 2019-12-09 VITALS
BODY MASS INDEX: 31.34 KG/M2 | WEIGHT: 218.94 LBS | HEIGHT: 70 IN | DIASTOLIC BLOOD PRESSURE: 68 MMHG | SYSTOLIC BLOOD PRESSURE: 112 MMHG

## 2019-12-09 DIAGNOSIS — N93.8 DUB (DYSFUNCTIONAL UTERINE BLEEDING): ICD-10-CM

## 2019-12-09 DIAGNOSIS — R10.2 PELVIC PAIN IN FEMALE: Primary | ICD-10-CM

## 2019-12-09 PROCEDURE — 88305 TISSUE EXAM BY PATHOLOGIST: CPT | Mod: 26,,, | Performed by: PATHOLOGY

## 2019-12-09 PROCEDURE — 99499 UNLISTED E&M SERVICE: CPT | Mod: S$GLB,,, | Performed by: OBSTETRICS & GYNECOLOGY

## 2019-12-09 PROCEDURE — 88305 TISSUE EXAM BY PATHOLOGIST: ICD-10-PCS | Mod: 26,,, | Performed by: PATHOLOGY

## 2019-12-09 PROCEDURE — 58100 BIOPSY OF UTERUS LINING: CPT | Mod: S$GLB,,, | Performed by: OBSTETRICS & GYNECOLOGY

## 2019-12-09 PROCEDURE — 58100 PR BIOPSY OF UTERUS LINING: ICD-10-PCS | Mod: S$GLB,,, | Performed by: OBSTETRICS & GYNECOLOGY

## 2019-12-09 PROCEDURE — 99499 NO LOS: ICD-10-PCS | Mod: S$GLB,,, | Performed by: OBSTETRICS & GYNECOLOGY

## 2019-12-09 PROCEDURE — 99999 PR PBB SHADOW E&M-EST. PATIENT-LVL III: ICD-10-PCS | Mod: PBBFAC,,, | Performed by: OBSTETRICS & GYNECOLOGY

## 2019-12-09 PROCEDURE — 88305 TISSUE EXAM BY PATHOLOGIST: CPT | Performed by: PATHOLOGY

## 2019-12-09 PROCEDURE — 99999 PR PBB SHADOW E&M-EST. PATIENT-LVL III: CPT | Mod: PBBFAC,,, | Performed by: OBSTETRICS & GYNECOLOGY

## 2019-12-09 NOTE — H&P (VIEW-ONLY)
HPI: Pt presents today for Embx and to sign consents for scheduled TLH/BSO on 12/20/2019. She has hx of premature ovarian failure and has been on HRT with estradiol/testosterone pellets and doing well. She takes progesterone orally. Still reports having a lot of spotting and lower abdominal pain and cramping. She is interested in surgical management as she does not want to have to worry about bleeding all the time with her hormones. 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     ROS:  GENERAL: Feeling well overall. Denies fever or chills.   SKIN: Denies rash or lesions.   HEAD: Denies head injury or headache.   NODES: Denies enlarged lymph nodes.   CHEST: Denies chest pain or shortness of breath.   CARDIOVASCULAR: Denies palpitations or left sided chest pain.   ABDOMEN: No abdominal pain, constipation, diarrhea, nausea, vomiting or rectal bleeding.   URINARY: No dysuria, hematuria, or burning on urination.  REPRODUCTIVE: See HPI.   BREASTS: Denies pain, lumps, or nipple discharge.   HEMATOLOGIC: No easy bruisability or excessive bleeding.   MUSCULOSKELETAL: Denies joint pain or swelling.   NEUROLOGIC: Denies syncope or weakness.   PSYCHIATRIC: Denies depression, anxiety or mood swings.    PE:   APPEARANCE: Well nourished, well developed, Black or  female in no acute distress.  ABDOMEN: Soft. No tenderness or masses. No distention. No hernias palpated. No CVA tenderness.  VULVA: No lesions. Normal external female genitalia.  URETHRAL MEATUS: Normal size and location, no lesions, no prolapse.  URETHRA: No masses, tenderness, or prolapse.  VAGINA: Moist. No lesions or lacerations noted. No abnormal discharge present. No odor present.   CERVIX: No lesions or discharge. No cervical motion tenderness.   UTERUS: Normal size, regular shape, mobile, non-tender.  ADNEXA: No tenderness. No fullness or masses palpated in the adnexal regions.   ANUS  PERINEUM: Normal.    PROCEDURE: Endometrial biopsy    INDICATION: DUB    PATIENT CONSENT:      PRE ENDOMETRIAL BIOPSY COUNSELING:   The patient was informed of the risk of bleeding, infection, uterine perforation and  pain and that the test will rule-out endometrial cancer with accuracy greater than 95%. She was counseled on the alternatives to endometrial biopsy.  All of her   questions were answered.  Patient gives written consent    ANESTHESIA: None    Labs: UPT performed prior to the procedure was negative.    PROCEDURE NOTE:  The cervix was visualized with a speculum and swabbed with Betadinex3.  The anterior lip of the cervix was grasped with a single toothed tenaculum, and a sterile endometrial pipelle was inserted into the uterus to a sound length of 8 cm. 2 passes were made with the pipelle and scant amount of tissue was obtained. The specimen was placed in formalin and sent to Pathology for evaluation.     COMPLICATIONS: None    DISPOSITION: The patient tolerated the above procedure well.      POST ENDOMETRIAL BIOPSY COUNSELING:  - Manage post biopsy cramping with NSAIDs or Tylenol.  - Expect spotting or light bleeding for a few days.  - Report bleeding heavier than a period, fever > 101.0 F, worsening pain or a foul  smelling vaginal discharge.      Shannon Fowler MD 12/09/2019 1:00 PM          Diagnosis:  1. Pelvic pain in female    2. DUB (dysfunctional uterine bleeding)        Plan:     Orders Placed This Encounter    Basic metabolic panel    CBC auto differential    Specimen to Pathology Gynecology and Obstetrics     I have discussed the risks, benefits, indications, and alternatives of the procedure in detail with the patient.  She verbalizes her understanding.  All questions answered.  Surgical consents signed.  The patient agrees to proceed with procedure as planned.    - TLH/BSO, cystoscopy planned for 12/20/2019.       Follow-up with me 6 weeks after surgery for post op check.

## 2019-12-12 ENCOUNTER — ANESTHESIA EVENT (OUTPATIENT)
Dept: SURGERY | Facility: OTHER | Age: 44
End: 2019-12-12
Payer: COMMERCIAL

## 2019-12-12 ENCOUNTER — HOSPITAL ENCOUNTER (OUTPATIENT)
Dept: PREADMISSION TESTING | Facility: OTHER | Age: 44
Discharge: HOME | End: 2019-12-12
Attending: OBSTETRICS & GYNECOLOGY
Payer: COMMERCIAL

## 2019-12-12 VITALS
HEART RATE: 62 BPM | HEIGHT: 70 IN | OXYGEN SATURATION: 98 % | TEMPERATURE: 98 F | DIASTOLIC BLOOD PRESSURE: 68 MMHG | BODY MASS INDEX: 31.86 KG/M2 | SYSTOLIC BLOOD PRESSURE: 116 MMHG

## 2019-12-12 DIAGNOSIS — Z01.818 PREOP TESTING: Primary | ICD-10-CM

## 2019-12-12 DIAGNOSIS — N93.8 DUB (DYSFUNCTIONAL UTERINE BLEEDING): ICD-10-CM

## 2019-12-12 LAB
ABO + RH BLD: NORMAL
ANION GAP SERPL CALC-SCNC: 6 MMOL/L (ref 8–16)
BASOPHILS # BLD AUTO: 0.03 K/UL (ref 0–0.2)
BASOPHILS NFR BLD: 0.6 % (ref 0–1.9)
BLD GP AB SCN CELLS X3 SERPL QL: NORMAL
BUN SERPL-MCNC: 10 MG/DL (ref 6–20)
CALCIUM SERPL-MCNC: 9 MG/DL (ref 8.7–10.5)
CHLORIDE SERPL-SCNC: 107 MMOL/L (ref 95–110)
CO2 SERPL-SCNC: 26 MMOL/L (ref 23–29)
CREAT SERPL-MCNC: 0.9 MG/DL (ref 0.5–1.4)
DIFFERENTIAL METHOD: ABNORMAL
EOSINOPHIL # BLD AUTO: 0.1 K/UL (ref 0–0.5)
EOSINOPHIL NFR BLD: 1.7 % (ref 0–8)
ERYTHROCYTE [DISTWIDTH] IN BLOOD BY AUTOMATED COUNT: 12.5 % (ref 11.5–14.5)
EST. GFR  (AFRICAN AMERICAN): >60 ML/MIN/1.73 M^2
EST. GFR  (NON AFRICAN AMERICAN): >60 ML/MIN/1.73 M^2
GLUCOSE SERPL-MCNC: 87 MG/DL (ref 70–110)
HCT VFR BLD AUTO: 41.9 % (ref 37–48.5)
HGB BLD-MCNC: 13 G/DL (ref 12–16)
IMM GRANULOCYTES # BLD AUTO: 0.01 K/UL (ref 0–0.04)
IMM GRANULOCYTES NFR BLD AUTO: 0.2 % (ref 0–0.5)
LYMPHOCYTES # BLD AUTO: 1.8 K/UL (ref 1–4.8)
LYMPHOCYTES NFR BLD: 34.5 % (ref 18–48)
MCH RBC QN AUTO: 27 PG (ref 27–31)
MCHC RBC AUTO-ENTMCNC: 31 G/DL (ref 32–36)
MCV RBC AUTO: 87 FL (ref 82–98)
MONOCYTES # BLD AUTO: 0.3 K/UL (ref 0.3–1)
MONOCYTES NFR BLD: 6.2 % (ref 4–15)
NEUTROPHILS # BLD AUTO: 3 K/UL (ref 1.8–7.7)
NEUTROPHILS NFR BLD: 56.8 % (ref 38–73)
NRBC BLD-RTO: 0 /100 WBC
PLATELET # BLD AUTO: 324 K/UL (ref 150–350)
PMV BLD AUTO: 10.4 FL (ref 9.2–12.9)
POTASSIUM SERPL-SCNC: 4.2 MMOL/L (ref 3.5–5.1)
RBC # BLD AUTO: 4.82 M/UL (ref 4–5.4)
SODIUM SERPL-SCNC: 139 MMOL/L (ref 136–145)
WBC # BLD AUTO: 5.33 K/UL (ref 3.9–12.7)

## 2019-12-12 PROCEDURE — 86901 BLOOD TYPING SEROLOGIC RH(D): CPT

## 2019-12-12 PROCEDURE — 80048 BASIC METABOLIC PNL TOTAL CA: CPT

## 2019-12-12 PROCEDURE — 85025 COMPLETE CBC W/AUTO DIFF WBC: CPT

## 2019-12-12 RX ORDER — SODIUM CHLORIDE, SODIUM LACTATE, POTASSIUM CHLORIDE, CALCIUM CHLORIDE 600; 310; 30; 20 MG/100ML; MG/100ML; MG/100ML; MG/100ML
INJECTION, SOLUTION INTRAVENOUS CONTINUOUS
Status: CANCELLED | OUTPATIENT
Start: 2019-12-12

## 2019-12-12 RX ORDER — FAMOTIDINE 20 MG/1
20 TABLET, FILM COATED ORAL
Status: CANCELLED | OUTPATIENT
Start: 2019-12-12 | End: 2019-12-12

## 2019-12-12 RX ORDER — ACETAMINOPHEN 500 MG
1000 TABLET ORAL
Status: CANCELLED | OUTPATIENT
Start: 2019-12-12 | End: 2019-12-12

## 2019-12-12 RX ORDER — PREGABALIN 75 MG/1
75 CAPSULE ORAL
Status: CANCELLED | OUTPATIENT
Start: 2019-12-12 | End: 2019-12-12

## 2019-12-12 NOTE — ANESTHESIA PREPROCEDURE EVALUATION
12/12/2019  Bhavana Bright is a 44 y.o., female.    Anesthesia Evaluation    I have reviewed the Patient Summary Reports.    I have reviewed the Nursing Notes.   I have reviewed the Medications.     Review of Systems  Anesthesia Hx:  No problems with previous Anesthesia  Denies Family Hx of Anesthesia complications.   Denies Personal Hx of Anesthesia complications.   Social:  Non-Smoker    Hematology/Oncology:  Hematology Normal   Oncology Normal     Cardiovascular:  Cardiovascular Normal     Pulmonary:  Pulmonary Normal    Renal/:  Renal/ Normal     Hepatic/GI:  Hepatic/GI Normal    Musculoskeletal:  Musculoskeletal Normal    Neurological:  Neurology Normal    Endocrine:  Endocrine Normal        Physical Exam  General:  Obesity    Airway/Jaw/Neck:  Airway Findings: Mouth Opening: Normal Tongue: Normal  General Airway Assessment: Adult  Mallampati: I  TM Distance: Normal, at least 6 cm         Dental:  Dental Findings: In tact             Anesthesia Plan  Type of Anesthesia, risks & benefits discussed:  Anesthesia Type:  general  Patient's Preference:   Intra-op Monitoring Plan: standard ASA monitors  Intra-op Monitoring Plan Comments:   Post Op Pain Control Plan: multimodal analgesia  Post Op Pain Control Plan Comments:   Induction:   IV  Beta Blocker:         Informed Consent: Patient understands risks and agrees with Anesthesia plan.  Questions answered. Anesthesia consent signed with patient.  ASA Score: 2     Day of Surgery Review of History & Physical:    H&P update referred to the surgeon.     Anesthesia Plan Notes: hct 42        Ready For Surgery From Anesthesia Perspective.

## 2019-12-12 NOTE — DISCHARGE INSTRUCTIONS
PRE-ADMIT TESTING -  [phone]    [address]  [place] [facility]          Your surgery has been scheduled at [facility]. We are pleased to have the opportunity to serve you. For Further Information please call [phone].    On the day of surgery please report to the Information Desk on the 1st floor.    · CONTACT YOUR PHYSICIAN'S OFFICE THE DAY PRIOR TO YOUR SURGERY TO OBTAIN YOUR ARRIVAL TIME.     · The evening before surgery do not eat anything after 9 p.m. ( this includes hard candy, chewing gum and mints).  You may only have GATORADE, POWERADE AND WATER  from 9 p.m. until you leave your home.   DO NOT DRINK ANY LIQUIDS ON THE WAY TO THE HOSPITAL.      SPECIAL MEDICATION INSTRUCTIONS: TAKE medications checked off by the Anesthesiologist on your Medication List.    Angiogram Patients: Take medications as instructed by your physician, including aspirin.     Surgery Patients:    If you take ASPIRIN - Your PHYSICIAN/SURGEON will need to inform you IF/OR when you need to stop taking aspirin prior to your surgery.     Do Not take any medications containing IBUPROFEN.  Do Not Wear any make-up or dark nail polish   (especially eye make-up) to surgery. If you come to surgery with makeup on you will be required to remove the makeup or nail polish.    Do not shave your surgical area at least 5 days prior to your surgery. The surgical prep will be performed at the hospital according to Infection Control regulations.    Leave all valuables at home.   Do Not wear any jewelry or watches, including any metal in body piercings. Jewelry must be removed prior to coming to the hospital.  There is a possibility that rings that are unable to be removed may be cut off if they are on the surgical extremity.    Contact Lens must be removed before surgery. Either do not wear the contact lens or bring a case and solution for storage.  Please bring a container for eyeglasses or dentures as required.  Bring  any paperwork your physician has provided, such as consent forms,  history and physicals, doctor's orders, etc.   Bring comfortable clothes that are loose fitting to wear upon discharge. Take into consideration the type of surgery being performed.  Maintain your diet as advised per your physician the day prior to surgery.      Adequate rest the night before surgery is advised.   Park in the Parking lot behind the hospital or in the [facility] across the street from the parking lot. Parking is complimentary.  If you will be discharged the same day as your procedure, please arrange for a responsible adult to drive you home or to accompany you if traveling by taxi.   YOU WILL NOT BE PERMITTED TO DRIVE OR TO LEAVE THE HOSPITAL ALONE AFTER SURGERY.   It is strongly recommended that you arrange for someone to remain with you for the first 24 hrs following your surgery.    The Surgeon will speak to your family/visitor after your surgery regarding the outcome of your surgery and post op care.  The Surgeon may speak to you after your surgery, but there is a possibility you may not remember the details.  Please check with your family members regarding the conversation with the Surgeon.    We strongly recommend whoever is bringing you home be present for discharge instructions.  This will ensure a thorough understanding for your post op home care.    EACH PATIENT IS ALLOWED TWO FAMILY MEMBERS OR VISITORS IN THE ROOM AND IN THE WAITING ROOMS WHILE YOU ARE IN SURGERY. ALL CHILDREN MUST ALWAYS BE ACCOMPANIED BY AN ADULT.    Thank you for your cooperation.  The Staff of [facility].                Bathing Instructions with Hibiclens     Shower the evening before and morning of your procedure with Hibiclens:   Wash your face with water and your regular face wash/soap   Apply Hibiclens directly on your skin or on a wet washcloth and wash gently. When showering: Move away from the shower stream when  applying Hibiclens to avoid rinsing off too soon.   Rinse thoroughly with warm water   Do not dilute Hibiclens         Dry off as usual, do not use any deodorant, powder, body lotions, perfume, after shave or cologne.

## 2019-12-19 ENCOUNTER — TELEPHONE (OUTPATIENT)
Dept: OBSTETRICS AND GYNECOLOGY | Facility: CLINIC | Age: 44
End: 2019-12-19

## 2019-12-20 ENCOUNTER — HOSPITAL ENCOUNTER (OUTPATIENT)
Facility: OTHER | Age: 44
Discharge: HOME | End: 2019-12-21
Attending: OBSTETRICS & GYNECOLOGY | Admitting: OBSTETRICS & GYNECOLOGY
Payer: COMMERCIAL

## 2019-12-20 ENCOUNTER — ANESTHESIA (OUTPATIENT)
Dept: SURGERY | Facility: OTHER | Age: 44
End: 2019-12-20
Payer: COMMERCIAL

## 2019-12-20 DIAGNOSIS — N93.8 DUB (DYSFUNCTIONAL UTERINE BLEEDING): Primary | ICD-10-CM

## 2019-12-20 DIAGNOSIS — E28.39 PREMATURE OVARIAN FAILURE: ICD-10-CM

## 2019-12-20 LAB
B-HCG UR QL: NEGATIVE
CTP QC/QA: YES
POCT GLUCOSE: 80 MG/DL (ref 70–110)

## 2019-12-20 PROCEDURE — 25000003 PHARM REV CODE 250: Performed by: ANESTHESIOLOGY

## 2019-12-20 PROCEDURE — 63600175 PHARM REV CODE 636 W HCPCS: Performed by: STUDENT IN AN ORGANIZED HEALTH CARE EDUCATION/TRAINING PROGRAM

## 2019-12-20 PROCEDURE — 63600175 PHARM REV CODE 636 W HCPCS: Performed by: NURSE ANESTHETIST, CERTIFIED REGISTERED

## 2019-12-20 PROCEDURE — 94799 UNLISTED PULMONARY SVC/PX: CPT

## 2019-12-20 PROCEDURE — 25000003 PHARM REV CODE 250: Performed by: STUDENT IN AN ORGANIZED HEALTH CARE EDUCATION/TRAINING PROGRAM

## 2019-12-20 PROCEDURE — 25000003 PHARM REV CODE 250: Performed by: OBSTETRICS & GYNECOLOGY

## 2019-12-20 PROCEDURE — 88307 PR  SURG PATH,LEVEL V: ICD-10-PCS | Mod: 26,,, | Performed by: PATHOLOGY

## 2019-12-20 PROCEDURE — 88307 TISSUE EXAM BY PATHOLOGIST: CPT | Performed by: PATHOLOGY

## 2019-12-20 PROCEDURE — 71000039 HC RECOVERY, EACH ADD'L HOUR: Performed by: OBSTETRICS & GYNECOLOGY

## 2019-12-20 PROCEDURE — 37000008 HC ANESTHESIA 1ST 15 MINUTES: Performed by: OBSTETRICS & GYNECOLOGY

## 2019-12-20 PROCEDURE — 94761 N-INVAS EAR/PLS OXIMETRY MLT: CPT

## 2019-12-20 PROCEDURE — 37000009 HC ANESTHESIA EA ADD 15 MINS: Performed by: OBSTETRICS & GYNECOLOGY

## 2019-12-20 PROCEDURE — 71000033 HC RECOVERY, INTIAL HOUR: Performed by: OBSTETRICS & GYNECOLOGY

## 2019-12-20 PROCEDURE — 36000711: Performed by: OBSTETRICS & GYNECOLOGY

## 2019-12-20 PROCEDURE — 82962 GLUCOSE BLOOD TEST: CPT | Performed by: OBSTETRICS & GYNECOLOGY

## 2019-12-20 PROCEDURE — 63600175 PHARM REV CODE 636 W HCPCS: Performed by: OBSTETRICS & GYNECOLOGY

## 2019-12-20 PROCEDURE — 58571 TLH W/T/O 250 G OR LESS: CPT | Mod: ,,, | Performed by: OBSTETRICS & GYNECOLOGY

## 2019-12-20 PROCEDURE — 27201423 OPTIME MED/SURG SUP & DEVICES STERILE SUPPLY: Performed by: OBSTETRICS & GYNECOLOGY

## 2019-12-20 PROCEDURE — 25000003 PHARM REV CODE 250: Performed by: NURSE ANESTHETIST, CERTIFIED REGISTERED

## 2019-12-20 PROCEDURE — 63600175 PHARM REV CODE 636 W HCPCS: Performed by: ANESTHESIOLOGY

## 2019-12-20 PROCEDURE — 63600175 PHARM REV CODE 636 W HCPCS

## 2019-12-20 PROCEDURE — 88307 TISSUE EXAM BY PATHOLOGIST: CPT | Mod: 26,,, | Performed by: PATHOLOGY

## 2019-12-20 PROCEDURE — 58571 PR LAPAROSCOPY W TOT HYSTERECTUTERUS <=250 GRAM  W TUBE/OVARY: ICD-10-PCS | Mod: ,,, | Performed by: OBSTETRICS & GYNECOLOGY

## 2019-12-20 PROCEDURE — 81025 URINE PREGNANCY TEST: CPT | Performed by: ANESTHESIOLOGY

## 2019-12-20 PROCEDURE — 36000710: Performed by: OBSTETRICS & GYNECOLOGY

## 2019-12-20 RX ORDER — CEFAZOLIN SODIUM 2 G/50ML
2 SOLUTION INTRAVENOUS
Status: COMPLETED | OUTPATIENT
Start: 2019-12-20 | End: 2019-12-20

## 2019-12-20 RX ORDER — ONDANSETRON 2 MG/ML
4 INJECTION INTRAMUSCULAR; INTRAVENOUS DAILY PRN
Status: DISCONTINUED | OUTPATIENT
Start: 2019-12-20 | End: 2019-12-20 | Stop reason: HOSPADM

## 2019-12-20 RX ORDER — HYDROCODONE BITARTRATE AND ACETAMINOPHEN 5; 325 MG/1; MG/1
1 TABLET ORAL EVERY 4 HOURS PRN
Status: DISCONTINUED | OUTPATIENT
Start: 2019-12-20 | End: 2019-12-21 | Stop reason: HOSPADM

## 2019-12-20 RX ORDER — SODIUM CHLORIDE 0.9 % (FLUSH) 0.9 %
3 SYRINGE (ML) INJECTION
Status: DISCONTINUED | OUTPATIENT
Start: 2019-12-20 | End: 2019-12-20

## 2019-12-20 RX ORDER — ACETAMINOPHEN 500 MG
1000 TABLET ORAL
Status: COMPLETED | OUTPATIENT
Start: 2019-12-20 | End: 2019-12-20

## 2019-12-20 RX ORDER — ONDANSETRON 2 MG/ML
INJECTION INTRAMUSCULAR; INTRAVENOUS
Status: DISCONTINUED | OUTPATIENT
Start: 2019-12-20 | End: 2019-12-20

## 2019-12-20 RX ORDER — LIDOCAINE HCL/PF 100 MG/5ML
SYRINGE (ML) INTRAVENOUS
Status: DISCONTINUED | OUTPATIENT
Start: 2019-12-20 | End: 2019-12-20

## 2019-12-20 RX ORDER — HYDROMORPHONE HYDROCHLORIDE 1 MG/ML
0.5 INJECTION, SOLUTION INTRAMUSCULAR; INTRAVENOUS; SUBCUTANEOUS EVERY 4 HOURS PRN
Status: DISCONTINUED | OUTPATIENT
Start: 2019-12-20 | End: 2019-12-21 | Stop reason: HOSPADM

## 2019-12-20 RX ORDER — DIPHENHYDRAMINE HYDROCHLORIDE 50 MG/ML
12.5 INJECTION INTRAMUSCULAR; INTRAVENOUS EVERY 30 MIN PRN
Status: DISCONTINUED | OUTPATIENT
Start: 2019-12-20 | End: 2019-12-20 | Stop reason: HOSPADM

## 2019-12-20 RX ORDER — SODIUM CHLORIDE, SODIUM LACTATE, POTASSIUM CHLORIDE, CALCIUM CHLORIDE 600; 310; 30; 20 MG/100ML; MG/100ML; MG/100ML; MG/100ML
INJECTION, SOLUTION INTRAVENOUS CONTINUOUS
Status: DISCONTINUED | OUTPATIENT
Start: 2019-12-20 | End: 2019-12-20

## 2019-12-20 RX ORDER — PREGABALIN 75 MG/1
75 CAPSULE ORAL
Status: COMPLETED | OUTPATIENT
Start: 2019-12-20 | End: 2019-12-20

## 2019-12-20 RX ORDER — DEXAMETHASONE SODIUM PHOSPHATE 4 MG/ML
INJECTION, SOLUTION INTRA-ARTICULAR; INTRALESIONAL; INTRAMUSCULAR; INTRAVENOUS; SOFT TISSUE
Status: DISCONTINUED | OUTPATIENT
Start: 2019-12-20 | End: 2019-12-20

## 2019-12-20 RX ORDER — ROCURONIUM BROMIDE 10 MG/ML
INJECTION, SOLUTION INTRAVENOUS
Status: DISCONTINUED | OUTPATIENT
Start: 2019-12-20 | End: 2019-12-20

## 2019-12-20 RX ORDER — DIPHENHYDRAMINE HCL 25 MG
25 CAPSULE ORAL EVERY 4 HOURS PRN
Status: DISCONTINUED | OUTPATIENT
Start: 2019-12-20 | End: 2019-12-21 | Stop reason: HOSPADM

## 2019-12-20 RX ORDER — OXYCODONE HYDROCHLORIDE 5 MG/1
5 TABLET ORAL
Status: DISCONTINUED | OUTPATIENT
Start: 2019-12-20 | End: 2019-12-20 | Stop reason: HOSPADM

## 2019-12-20 RX ORDER — PROPOFOL 10 MG/ML
VIAL (ML) INTRAVENOUS
Status: DISCONTINUED | OUTPATIENT
Start: 2019-12-20 | End: 2019-12-20

## 2019-12-20 RX ORDER — FAMOTIDINE 20 MG/1
20 TABLET, FILM COATED ORAL
Status: COMPLETED | OUTPATIENT
Start: 2019-12-20 | End: 2019-12-20

## 2019-12-20 RX ORDER — SODIUM CHLORIDE 9 MG/ML
INJECTION, SOLUTION INTRAVENOUS CONTINUOUS
Status: DISCONTINUED | OUTPATIENT
Start: 2019-12-20 | End: 2019-12-21 | Stop reason: HOSPADM

## 2019-12-20 RX ORDER — MUPIROCIN 20 MG/G
OINTMENT TOPICAL
Status: DISCONTINUED | OUTPATIENT
Start: 2019-12-20 | End: 2019-12-20 | Stop reason: HOSPADM

## 2019-12-20 RX ORDER — KETOROLAC TROMETHAMINE 30 MG/ML
INJECTION, SOLUTION INTRAMUSCULAR; INTRAVENOUS
Status: DISCONTINUED | OUTPATIENT
Start: 2019-12-20 | End: 2019-12-20

## 2019-12-20 RX ORDER — ONDANSETRON 8 MG/1
8 TABLET, ORALLY DISINTEGRATING ORAL EVERY 8 HOURS PRN
Status: DISCONTINUED | OUTPATIENT
Start: 2019-12-20 | End: 2019-12-21 | Stop reason: HOSPADM

## 2019-12-20 RX ORDER — MUPIROCIN 20 MG/G
1 OINTMENT TOPICAL 2 TIMES DAILY
Status: DISCONTINUED | OUTPATIENT
Start: 2019-12-20 | End: 2019-12-21 | Stop reason: HOSPADM

## 2019-12-20 RX ORDER — KETOROLAC TROMETHAMINE 30 MG/ML
30 INJECTION, SOLUTION INTRAMUSCULAR; INTRAVENOUS EVERY 6 HOURS
Status: DISCONTINUED | OUTPATIENT
Start: 2019-12-20 | End: 2019-12-21 | Stop reason: HOSPADM

## 2019-12-20 RX ORDER — SODIUM CHLORIDE 9 MG/ML
INJECTION, SOLUTION INTRAVENOUS CONTINUOUS
Status: DISCONTINUED | OUTPATIENT
Start: 2019-12-20 | End: 2019-12-20

## 2019-12-20 RX ORDER — GLYCOPYRROLATE 0.2 MG/ML
INJECTION INTRAMUSCULAR; INTRAVENOUS
Status: DISCONTINUED | OUTPATIENT
Start: 2019-12-20 | End: 2019-12-20

## 2019-12-20 RX ORDER — MIDAZOLAM HYDROCHLORIDE 1 MG/ML
INJECTION INTRAMUSCULAR; INTRAVENOUS
Status: DISCONTINUED | OUTPATIENT
Start: 2019-12-20 | End: 2019-12-20

## 2019-12-20 RX ORDER — IBUPROFEN 400 MG/1
800 TABLET ORAL EVERY 8 HOURS
Status: DISCONTINUED | OUTPATIENT
Start: 2019-12-21 | End: 2019-12-21 | Stop reason: HOSPADM

## 2019-12-20 RX ORDER — NEOSTIGMINE METHYLSULFATE 1 MG/ML
INJECTION, SOLUTION INTRAVENOUS
Status: DISCONTINUED | OUTPATIENT
Start: 2019-12-20 | End: 2019-12-20

## 2019-12-20 RX ORDER — MEPERIDINE HYDROCHLORIDE 25 MG/ML
12.5 INJECTION INTRAMUSCULAR; INTRAVENOUS; SUBCUTANEOUS ONCE AS NEEDED
Status: DISCONTINUED | OUTPATIENT
Start: 2019-12-20 | End: 2019-12-20 | Stop reason: HOSPADM

## 2019-12-20 RX ORDER — FENTANYL CITRATE 50 UG/ML
INJECTION, SOLUTION INTRAMUSCULAR; INTRAVENOUS
Status: DISCONTINUED | OUTPATIENT
Start: 2019-12-20 | End: 2019-12-20

## 2019-12-20 RX ORDER — HYDROMORPHONE HYDROCHLORIDE 2 MG/ML
0.4 INJECTION, SOLUTION INTRAMUSCULAR; INTRAVENOUS; SUBCUTANEOUS EVERY 5 MIN PRN
Status: DISCONTINUED | OUTPATIENT
Start: 2019-12-20 | End: 2019-12-20 | Stop reason: HOSPADM

## 2019-12-20 RX ORDER — SIMETHICONE 80 MG
80 TABLET,CHEWABLE ORAL EVERY 4 HOURS PRN
Status: DISCONTINUED | OUTPATIENT
Start: 2019-12-20 | End: 2019-12-21 | Stop reason: HOSPADM

## 2019-12-20 RX ADMIN — SODIUM CHLORIDE, SODIUM LACTATE, POTASSIUM CHLORIDE, AND CALCIUM CHLORIDE: 600; 310; 30; 20 INJECTION, SOLUTION INTRAVENOUS at 01:12

## 2019-12-20 RX ADMIN — PREGABALIN 75 MG: 75 CAPSULE ORAL at 09:12

## 2019-12-20 RX ADMIN — ONDANSETRON 4 MG: 2 INJECTION INTRAMUSCULAR; INTRAVENOUS at 02:12

## 2019-12-20 RX ADMIN — FENTANYL CITRATE 50 MCG: 50 INJECTION, SOLUTION INTRAMUSCULAR; INTRAVENOUS at 03:12

## 2019-12-20 RX ADMIN — KETOROLAC TROMETHAMINE 30 MG: 30 INJECTION, SOLUTION INTRAMUSCULAR at 11:12

## 2019-12-20 RX ADMIN — ONDANSETRON 4 MG: 2 INJECTION INTRAMUSCULAR; INTRAVENOUS at 04:12

## 2019-12-20 RX ADMIN — SODIUM CHLORIDE: 0.9 INJECTION, SOLUTION INTRAVENOUS at 05:12

## 2019-12-20 RX ADMIN — DEXAMETHASONE SODIUM PHOSPHATE 8 MG: 4 INJECTION, SOLUTION INTRAMUSCULAR; INTRAVENOUS at 12:12

## 2019-12-20 RX ADMIN — ROCURONIUM BROMIDE 10 MG: 10 INJECTION, SOLUTION INTRAVENOUS at 02:12

## 2019-12-20 RX ADMIN — CARBOXYMETHYLCELLULOSE SODIUM 2 DROP: 2.5 SOLUTION/ DROPS OPHTHALMIC at 12:12

## 2019-12-20 RX ADMIN — FENTANYL CITRATE 100 MCG: 50 INJECTION, SOLUTION INTRAMUSCULAR; INTRAVENOUS at 12:12

## 2019-12-20 RX ADMIN — NEOSTIGMINE METHYLSULFATE 4 MG: 1 INJECTION INTRAVENOUS at 03:12

## 2019-12-20 RX ADMIN — PROPOFOL 200 MG: 10 INJECTION, EMULSION INTRAVENOUS at 12:12

## 2019-12-20 RX ADMIN — ROCURONIUM BROMIDE 10 MG: 10 INJECTION, SOLUTION INTRAVENOUS at 01:12

## 2019-12-20 RX ADMIN — MUPIROCIN 1 G: 20 OINTMENT TOPICAL at 09:12

## 2019-12-20 RX ADMIN — SODIUM CHLORIDE, SODIUM LACTATE, POTASSIUM CHLORIDE, AND CALCIUM CHLORIDE: 600; 310; 30; 20 INJECTION, SOLUTION INTRAVENOUS at 10:12

## 2019-12-20 RX ADMIN — GLYCOPYRROLATE 0.6 MG: 0.2 INJECTION, SOLUTION INTRAMUSCULAR; INTRAVENOUS at 03:12

## 2019-12-20 RX ADMIN — MUPIROCIN: 20 OINTMENT TOPICAL at 09:12

## 2019-12-20 RX ADMIN — HYDROMORPHONE HYDROCHLORIDE 0.4 MG: 2 INJECTION, SOLUTION INTRAMUSCULAR; INTRAVENOUS; SUBCUTANEOUS at 04:12

## 2019-12-20 RX ADMIN — ACETAMINOPHEN 1000 MG: 500 TABLET, FILM COATED ORAL at 09:12

## 2019-12-20 RX ADMIN — CEFAZOLIN SODIUM 2 G: 2 SOLUTION INTRAVENOUS at 12:12

## 2019-12-20 RX ADMIN — KETOROLAC TROMETHAMINE 30 MG: 30 INJECTION, SOLUTION INTRAMUSCULAR at 06:12

## 2019-12-20 RX ADMIN — KETOROLAC TROMETHAMINE 30 MG: 30 INJECTION, SOLUTION INTRAMUSCULAR; INTRAVENOUS at 02:12

## 2019-12-20 RX ADMIN — FAMOTIDINE 20 MG: 20 TABLET ORAL at 09:12

## 2019-12-20 RX ADMIN — OXYCODONE HYDROCHLORIDE 5 MG: 5 TABLET ORAL at 05:12

## 2019-12-20 RX ADMIN — LIDOCAINE HYDROCHLORIDE 80 MG: 20 INJECTION, SOLUTION INTRAVENOUS at 12:12

## 2019-12-20 RX ADMIN — ROCURONIUM BROMIDE 50 MG: 10 INJECTION, SOLUTION INTRAVENOUS at 12:12

## 2019-12-20 RX ADMIN — MIDAZOLAM HYDROCHLORIDE 2 MG: 1 INJECTION, SOLUTION INTRAMUSCULAR; INTRAVENOUS at 11:12

## 2019-12-20 RX ADMIN — GLYCOPYRROLATE 0.2 MG: 0.2 INJECTION, SOLUTION INTRAMUSCULAR; INTRAVENOUS at 12:12

## 2019-12-20 NOTE — ANESTHESIA PROCEDURE NOTES
Intubation  Performed by: Kendra Ovalles CRNA  Authorized by: Jasen Danielle MD     Intubation:     Induction:  Intravenous    Intubated:  Postinduction    Mask Ventilation:  Easy mask    Attempts:  1    Attempted By:  CRNA    Method of Intubation:  Video laryngoscopy    Blade:  Mercado 3    Laryngeal View Grade: Grade I - full view of chords      Difficult Airway Encountered?: No      Complications:  None    Airway Device:  Oral endotracheal tube    Airway Device Size:  7.0    Style/Cuff Inflation:  Cuffed (inflated to minimal occlusive pressure)    Inflation Amount (mL):  4    Tube secured:  21    Secured at:  The lips    Placement Verified By:  Capnometry    Complicating Factors:  None    Findings Post-Intubation:  BS equal bilateral and atraumatic/condition of teeth unchanged

## 2019-12-20 NOTE — INTERVAL H&P NOTE
The patient has been examined and the H&P has been reviewed:    I concur with the findings and NO changes have been noted since the H&P was written    Anesthesia/Surgery risks, benefits and alternative options discussed and understood by patient/family.          Active Hospital Problems    Diagnosis  POA    DUB (dysfunctional uterine bleeding) [N93.8]  Yes      Resolved Hospital Problems   No resolved problems to display.

## 2019-12-20 NOTE — TRANSFER OF CARE
"Anesthesia Transfer of Care Note    Patient: Bhavana Bright    Procedure(s) Performed: Procedure(s) (LRB):  HYSTERECTOMY, TOTAL, LAPAROSCOPIC (N/A)  SALPINGO-OOPHORECTOMY, LAPAROSCOPIC (N/A)  CYSTOSCOPY (N/A)    Patient location: PACU    Anesthesia Type: general    Transport from OR: Transported from OR on 2-3 L/min O2 by NC with adequate spontaneous ventilation    Post pain: adequate analgesia    Post assessment: no apparent anesthetic complications    Post vital signs: stable    Level of consciousness: awake and alert    Nausea/Vomiting: no nausea/vomiting    Complications: none    Transfer of care protocol was followed      Last vitals:   Visit Vitals  BP (!) 140/88 (BP Location: Left arm, Patient Position: Sitting)   Pulse 67   Temp 36.8 °C (98.2 °F) (Oral)   Resp 18   Ht 5' 9.5" (1.765 m)   Wt 98.9 kg (218 lb)   LMP  (LMP Unknown)   SpO2 98%   Breastfeeding? No   BMI 31.73 kg/m²     "

## 2019-12-20 NOTE — ANESTHESIA POSTPROCEDURE EVALUATION
Anesthesia Post Evaluation    Patient: Bhavana Bright    Procedure(s) Performed: Procedure(s) (LRB):  HYSTERECTOMY, TOTAL, LAPAROSCOPIC (N/A)  SALPINGO-OOPHORECTOMY, LAPAROSCOPIC (N/A)  CYSTOSCOPY (N/A)    Final Anesthesia Type: general    Patient location during evaluation: PACU  Patient participation: Yes- Able to Participate  Level of consciousness: awake and alert  Post-procedure vital signs: reviewed and stable  Pain management: adequate  Airway patency: patent    PONV status at discharge: No PONV  Anesthetic complications: no      Cardiovascular status: blood pressure returned to baseline  Respiratory status: unassisted and room air  Hydration status: euvolemic  Follow-up not needed.          Vitals Value Taken Time   /58 12/20/2019  4:38 PM   Temp 36.8 °C (98.2 °F) 12/20/2019  3:51 PM   Pulse 70 12/20/2019  4:38 PM   Resp 16 12/20/2019  3:51 PM   SpO2 100 % 12/20/2019  4:38 PM   Vitals shown include unvalidated device data.      No case tracking events are documented in the log.      Pain/Valentina Score: Pain Rating Prior to Med Admin: 0 (12/20/2019  9:31 AM)  Pain Rating Post Med Admin: 0 (12/20/2019  4:20 PM)  Valentina Score: 8 (12/20/2019  4:20 PM)

## 2019-12-20 NOTE — ANESTHESIA POSTPROCEDURE EVALUATION
Anesthesia Post Evaluation    Patient: Bhavana Bright    Procedure(s) Performed: Procedure(s) (LRB):  HYSTERECTOMY, TOTAL, LAPAROSCOPIC (N/A)  SALPINGO-OOPHORECTOMY, LAPAROSCOPIC (N/A)  CYSTOSCOPY (N/A)    Final Anesthesia Type: general    Patient location during evaluation: PACU  Patient participation: Yes- Able to Participate  Level of consciousness: awake and alert and oriented  Post-procedure vital signs: reviewed and stable  Pain management: adequate  Airway patency: patent    PONV status at discharge: No PONV  Anesthetic complications: no      Cardiovascular status: blood pressure returned to baseline and hemodynamically stable  Respiratory status: unassisted, spontaneous ventilation and room air  Hydration status: euvolemic  Follow-up not needed.          Vitals Value Taken Time   /58 12/20/2019  4:38 PM   Temp 36.8 °C (98.2 °F) 12/20/2019  3:51 PM   Pulse 67 12/20/2019  4:47 PM   Resp 16 12/20/2019  3:51 PM   SpO2 100 % 12/20/2019  4:47 PM   Vitals shown include unvalidated device data.      No case tracking events are documented in the log.      Pain/Valentina Score: Pain Rating Prior to Med Admin: 7 (12/20/2019  4:46 PM)  Pain Rating Post Med Admin: 0 (12/20/2019  4:20 PM)  Valentina Score: 8 (12/20/2019  4:20 PM)

## 2019-12-20 NOTE — OR NURSING
C/o abdominal pain only when aroused and asked if she feels pain.  States yes and states it is a 7.  again refused percolone.  Will take dilaudid.  C/o nausea.  Too sleepy for phenergan

## 2019-12-21 VITALS
RESPIRATION RATE: 18 BRPM | HEART RATE: 72 BPM | BODY MASS INDEX: 31.5 KG/M2 | WEIGHT: 220 LBS | HEIGHT: 70 IN | OXYGEN SATURATION: 97 % | DIASTOLIC BLOOD PRESSURE: 78 MMHG | TEMPERATURE: 99 F | SYSTOLIC BLOOD PRESSURE: 131 MMHG

## 2019-12-21 LAB
BASOPHILS # BLD AUTO: 0.01 K/UL (ref 0–0.2)
BASOPHILS NFR BLD: 0.1 % (ref 0–1.9)
DIFFERENTIAL METHOD: ABNORMAL
EOSINOPHIL # BLD AUTO: 0 K/UL (ref 0–0.5)
EOSINOPHIL NFR BLD: 0 % (ref 0–8)
ERYTHROCYTE [DISTWIDTH] IN BLOOD BY AUTOMATED COUNT: 12.5 % (ref 11.5–14.5)
HCT VFR BLD AUTO: 34.3 % (ref 37–48.5)
HGB BLD-MCNC: 11 G/DL (ref 12–16)
IMM GRANULOCYTES # BLD AUTO: 0.06 K/UL (ref 0–0.04)
IMM GRANULOCYTES NFR BLD AUTO: 0.5 % (ref 0–0.5)
LYMPHOCYTES # BLD AUTO: 1.2 K/UL (ref 1–4.8)
LYMPHOCYTES NFR BLD: 9.6 % (ref 18–48)
MCH RBC QN AUTO: 27.6 PG (ref 27–31)
MCHC RBC AUTO-ENTMCNC: 32.1 G/DL (ref 32–36)
MCV RBC AUTO: 86 FL (ref 82–98)
MONOCYTES # BLD AUTO: 0.6 K/UL (ref 0.3–1)
MONOCYTES NFR BLD: 5.3 % (ref 4–15)
NEUTROPHILS # BLD AUTO: 10.1 K/UL (ref 1.8–7.7)
NEUTROPHILS NFR BLD: 84.5 % (ref 38–73)
NRBC BLD-RTO: 0 /100 WBC
PLATELET # BLD AUTO: 294 K/UL (ref 150–350)
PMV BLD AUTO: 10.2 FL (ref 9.2–12.9)
RBC # BLD AUTO: 3.99 M/UL (ref 4–5.4)
WBC # BLD AUTO: 11.98 K/UL (ref 3.9–12.7)

## 2019-12-21 PROCEDURE — 63600175 PHARM REV CODE 636 W HCPCS: Performed by: STUDENT IN AN ORGANIZED HEALTH CARE EDUCATION/TRAINING PROGRAM

## 2019-12-21 PROCEDURE — 25000003 PHARM REV CODE 250: Performed by: STUDENT IN AN ORGANIZED HEALTH CARE EDUCATION/TRAINING PROGRAM

## 2019-12-21 PROCEDURE — 36415 COLL VENOUS BLD VENIPUNCTURE: CPT

## 2019-12-21 PROCEDURE — 85025 COMPLETE CBC W/AUTO DIFF WBC: CPT

## 2019-12-21 RX ORDER — HYDROCODONE BITARTRATE AND ACETAMINOPHEN 5; 325 MG/1; MG/1
1 TABLET ORAL EVERY 4 HOURS PRN
Qty: 20 TABLET | Refills: 0 | Status: SHIPPED | OUTPATIENT
Start: 2019-12-21 | End: 2020-11-11

## 2019-12-21 RX ORDER — IBUPROFEN 800 MG/1
800 TABLET ORAL EVERY 6 HOURS PRN
Qty: 30 TABLET | Refills: 0 | Status: SHIPPED | OUTPATIENT
Start: 2019-12-21 | End: 2020-11-11

## 2019-12-21 RX ADMIN — SIMETHICONE CHEW TAB 80 MG 80 MG: 80 TABLET ORAL at 05:12

## 2019-12-21 RX ADMIN — HYDROCODONE BITARTRATE AND ACETAMINOPHEN 1 TABLET: 5; 325 TABLET ORAL at 05:12

## 2019-12-21 RX ADMIN — KETOROLAC TROMETHAMINE 30 MG: 30 INJECTION, SOLUTION INTRAMUSCULAR at 05:12

## 2019-12-21 RX ADMIN — SIMETHICONE CHEW TAB 80 MG 80 MG: 80 TABLET ORAL at 08:12

## 2019-12-21 RX ADMIN — MUPIROCIN 1 G: 20 OINTMENT TOPICAL at 08:12

## 2019-12-21 NOTE — OP NOTE
OPERATIVE REPORT    DATE: 12/20/2019    PREOPERATIVE DIAGNOSIS  1.Pelvic pain    POSTOPERATIVE DIAGNOSIS  1.same    PROCEDURE:  1. Total Laparoscopic Hysterectomy  2. Bilateral salping oophorectomy   3. Cystoscopy    SURGEON: Dr. Shannon Fowler    ASSISTANT: Viry Bradford, PGY-4    ANESTHESIA: General    COMPLICATIONS: None    EBL: 700 cc    IV FLUIDS: 1500 cc    URINE OUTPUT: 600 cc    FINDINGS:  1. Normal external genitalia, uterus sounds to 10cm  2.  Anterior uterus densely adhered to anterior abdominal wall. Bladder instilled with sterile NS and was not involved in the dense adhesions  3. Bilateral streak ovaries  4. Otherwise normal abdominopelvic survey  5. After extensive BENNETT, patient underwent uncomplicated TLH/BSO.   6. Post-procedure cystoscopy performed noting brisk efflux from bilateral ureteral orifices with no evidence of injury on full survey.     PROCEDURE IN DETAIL:  Patient was taken to the operating room where general anesthesia was administered and found to be adequate. She was prepped and draped in the dorsal lithotomy position. A weighted sterile speculum was placed in the vagina. The anterior lip of the cervix was grasped with a single tooth tenaculum. The uterus was sounded to approximately 10 cm. A stay suture of 0-Vicryl was placed at 12 o'clock and 6 o'clock on the cervix. A RADHA manipulator was placed inside the endometrial cavity.     Gloves were changed. A Veress needle was inserted into the umbilicus under tenting of the anterior abdominal wall. Placement into the peritoneal cavity was confirmed via saline drop test. The abdomen was insufflated to 15mm Hg using Carbon dioxide. A 10 mm infraumbilical skin incision was made with the scalpel. A 10 mm trocar was advanced through this incision under direct visualization. Excellent hemostasis was noted. The patient was placed in deep Trendelenburg. An 5 mm left lateral skin incision was made with a scalpel and a 5 mm trocar was advanced  through this incision under visualization of the camera. A 5 mm right lateral skin incision was made with the scalpel. A 5 mm trocar was advanced through this incision under visualization of the camera. Excellent hemostasis was noted.     A brief upper abdominal survey demonstrated normal anatomy. Attention was then turned to the pelvis, noting the above listed findings. The monopolar scissors were used to transect the left and right round ligaments in attempts to separate the anterior and posterior leaves of the broad ligament to begin a bladder flap and find an appropriate plane, however this was difficult due to the dense adhesion of the anterior uterus to the anterior abdominal wall. We used the monopolar scissors and gentle traction to begin taking down this adhesion, however we were unable to develop a good plane at this time.    We then turned to the right adnexa. The right ureter was visualized. The right IP ligament was cauterized and transected with the Ligasure well away from the ureter. This dissection was taken to the level of the uterus. This was also performed on the left after visualizing the left ureter without difficulty. This helped mobilize the uterus however we were still unable to develop an appropriate plane to take down the dense adhesion as there was not adequate visualization of the bladder.   At this time we backfilled the bladder with sterile normal saline. The boundaries of the bladder became obvious and it was noted that the bladder was not involved in this dense adhesion.  Thus we were able to come through the adhesion with the monopolar scissors, while visualizing the bladder, which was inferior to the adhesion.   The bladder flap was then developed and taken down off of the cervix, past the level of the Aaliyah Eddie. The posterior leaf of the broad ligament was taken down bilaterally to skeletonize the uterine vessels.   The anterior colpotomy was then created, as was the posterior  colpotomy. The left uterine vessels were cauterized with the Ligasure but not yet transected. The right uterine vessels were cauterize as well and not yet transected. The colpotomy was continued to the level of the bilateral uterine vessels, which were again cauterized, and this time transected. The anterior and posterior colpotomies were then connected.   Attention was turned to the vagina, where the uterus, cervix, bilateral tubes and bilateral ovaries were removed without difficulty. The bilateral vaginal cuff angles were secured with Allis clamps. The posterior peritoneum was affixed to the posterior cuff in a running locking fashion with 0 Vicryl. The anterior and posterior cuff was reapproximated with a running stitch of 0 Vicryl. Good hemostasis was noted vaginally.   The jaffe catheter was removed. A cystoscope was advanced through the urethra. The bladder was inspected and found to be intact. Both ureters were seen with brisk normal efflux. The cystoscope was removed and a new sterile jaffe catheter was again inserted through the urethra.   The abdomen was re-insufflated and the 10mm laparoscope was used to inspect the pelvis, which was irrigated ans suctioned noting excellent hemostasis. The vaginal cuff was inspected and was hemostatic and well approximated from this view as well. There was no bowel or omentum incorporated into the vaginal cuff suture line.   Attention was turned to the anterior abdominal wall. The 10mm umbilical port fascia was reapproximated with the Suture Ease. The abdomen was deflated and all trocars were removed. The skin was closed with 4-0 Monocryl in a subcuticular fashion. The sponge and glove were removed. Sponge, lap, and needle counts were correct x 2. The patient was taken to the recovery room in stable condition with the jaffe draining urine.     Viry Bradford MD  OB/GYN, PGY-4

## 2019-12-21 NOTE — NURSING
Received patient to room 380 from Recovery. Pt transferred via stretcher to bed with minimal assist. Pt AAO x 4 with slight drowsiness noted. Able to be aroused verbally. Lap sites x 3 with steri strips and bandaids CDI. F/C patent and draining clear yellow urine to g/u bag. SCD's in place bilateral. O2@2L/NC. Tolerating well. VSS. Oriented to room. Spouse @ bedside. Safety maintained. Bed in lowest position and locked. Pt states her pain level is @ 6 within tolerance level. Pt had Oxycodone IR 5mg po recently. Will administer Toradol 30mg IV as ordered. Call light in reach.

## 2019-12-21 NOTE — SUBJECTIVE & OBJECTIVE
Interval History: Patient doing well post-op with pain adequately controlled on ordered meds. She is tolerating regular without N/V. She is voiding and ambulating independently. Small amt flatus.    Scheduled Meds:   ketorolac  30 mg Intravenous Q6H    Followed by    ibuprofen  800 mg Oral Q8H    mupirocin  1 g Nasal BID     Continuous Infusions:   sodium chloride 0.9% 75 mL/hr at 12/20/19 1754     PRN Meds:diphenhydrAMINE, HYDROcodone-acetaminophen, HYDROmorphone, ondansetron, promethazine (PHENERGAN) IVPB, simethicone    Review of patient's allergies indicates:  No Known Allergies    Objective:     Vital Signs (Most Recent):  Temp: 98.5 °F (36.9 °C) (12/21/19 0720)  Pulse: 72 (12/21/19 0720)  Resp: 18 (12/21/19 0720)  BP: 131/78 (12/21/19 0720)  SpO2: 97 % (12/21/19 0720) Vital Signs (24h Range):  Temp:  [97.4 °F (36.3 °C)-98.7 °F (37.1 °C)] 98.5 °F (36.9 °C)  Pulse:  [60-85] 72  Resp:  [6-161] 18  SpO2:  [97 %-100 %] 97 %  BP: (117-146)/(57-78) 131/78     Weight: 99.8 kg (220 lb)  Body mass index is 32.02 kg/m².  No LMP recorded (lmp unknown). Patient has had an implant.    I&O (Last 24H):    Intake/Output Summary (Last 24 hours) at 12/21/2019 0955  Last data filed at 12/20/2019 2100  Gross per 24 hour   Intake 1800 ml   Output 1850 ml   Net -50 ml       Physical Exam:   Constitutional: She is oriented to person, place, and time. No distress.       Cardiovascular: Normal rate.     Pulmonary/Chest: Effort normal. No respiratory distress.        Abdominal: Soft. She exhibits abdominal incision (port sites c/d/i x 3). She exhibits no distension. There is no tenderness. There is no rebound and no guarding.   BS present, hypoactive             Musculoskeletal: She exhibits no edema or tenderness.   SCDs in place       Neurological: She is alert and oriented to person, place, and time.     Psychiatric: She has a normal mood and affect.       Laboratory:  CBC:   Recent Labs   Lab 12/21/19  0315   WBC 11.98   RBC  3.99*   HGB 11.0*   HCT 34.3*      MCV 86   MCH 27.6   MCHC 32.1       Diagnostic Results:  n/a

## 2019-12-21 NOTE — HOSPITAL COURSE
Patient presented for scheduled procedure. Patient was passed back to OR for the below mentioned procedure. Please see OP note for further details. Tolerated procedure well and patient was taken to recovery in a stable condition. Prior to discharge patient was able to void, ambulate, tolerate PO and pain was well controlled with PO meds. Patient was given routine post-op instructions for which patient voiced understanding. Patient was subsequently discharged home.

## 2019-12-21 NOTE — ASSESSMENT & PLAN NOTE
Routine post op care s/p TLH/BSO  - Pain control: continue current meds  - Diet: regular  - Robb: s/p removal, voiding freely  - Labs: stable  - PPx: early ambulation, SCDs  - Dispo: home today

## 2019-12-21 NOTE — PLAN OF CARE
VSS and afebrile, AAOx4. LAP sites x3 CDI, no complaints of pain this shift. Patient ambulating to restroom independently. Regular diet tolerated well. Plan of care reviewed with patient. Purposeful rounding done, call light at bed side, bed at lowest position, brakes on, non-skid socks on, will continue to monitor.

## 2019-12-21 NOTE — BRIEF OP NOTE
[facility]  Brief Operative Note    SUMMARY     Surgery Date: 12/20/2019     Surgeon(s) and Role:     * Shannon Fowler MD - Primary     * Viry Bradford MD - Assisting        Pre-op Diagnosis:  Pelvic pain in female [R10.2]  Intramural leiomyoma of uterus [D25.1]    Post-op Diagnosis:  Post-Op Diagnosis Codes:     * Pelvic pain in female [R10.2]     * Intramural leiomyoma of uterus [D25.1]    Procedure(s) (LRB):  HYSTERECTOMY, TOTAL, LAPAROSCOPIC (N/A)  SALPINGO-OOPHORECTOMY, LAPAROSCOPIC (N/A)  CYSTOSCOPY (N/A)    Anesthesia: General    Description of the findings of the procedure:   1. Normal external genitalia, uterus sounds to 10cm  2.  Anterior uterus densely adhered to anterior abdominal wall. Bladder instilled with sterile NS and was not involved in the dense adhesions  3. Bilateral streak ovaries  4. Otherwise normal abdominopelvic survey    After extensive BENNETT, patient underwent uncomplicated TLH/BSO.    Post-procedure cystoscopy performed noting brisk efflux from bilateral ureteral orifices with no evidence of injury on full survey.     Estimated Blood Loss: 700 mL         Specimens:   Specimen (12h ago, onward)    None

## 2019-12-21 NOTE — PLAN OF CARE
12/21/19 1105   Final Note   Assessment Type Final Discharge Note   Anticipated Discharge Disposition Home   What phone number can be called within the next 1-3 days to see how you are doing after discharge? [phone]   Hospital Follow Up  Appt(s) scheduled? No   Right Care Referral Info   Post Acute Recommendation No Care

## 2019-12-21 NOTE — PROGRESS NOTES
[facility]  Obstetrics & Gynecology  Progress Note    Patient Name: Bhavana Bright  MRN: [id number]  Admission Date: 12/20/2019  Primary Care Provider: Primary Doctor No  Principal Problem: <principal problem not specified>    Subjective:     HPI:  No notes on file    Interval History: Patient doing well post-op with pain adequately controlled on ordered meds. She is tolerating regular without N/V. She is voiding and ambulating independently. Small amt flatus.    Scheduled Meds:   ketorolac  30 mg Intravenous Q6H    Followed by    ibuprofen  800 mg Oral Q8H    mupirocin  1 g Nasal BID     Continuous Infusions:   sodium chloride 0.9% 75 mL/hr at 12/20/19 1754     PRN Meds:diphenhydrAMINE, HYDROcodone-acetaminophen, HYDROmorphone, ondansetron, promethazine (PHENERGAN) IVPB, simethicone    Review of patient's allergies indicates:  No Known Allergies    Objective:     Vital Signs (Most Recent):  Temp: 98.5 °F (36.9 °C) (12/21/19 0720)  Pulse: 72 (12/21/19 0720)  Resp: 18 (12/21/19 0720)  BP: 131/78 (12/21/19 0720)  SpO2: 97 % (12/21/19 0720) Vital Signs (24h Range):  Temp:  [97.4 °F (36.3 °C)-98.7 °F (37.1 °C)] 98.5 °F (36.9 °C)  Pulse:  [60-85] 72  Resp:  [6-161] 18  SpO2:  [97 %-100 %] 97 %  BP: (117-146)/(57-78) 131/78     Weight: 99.8 kg (220 lb)  Body mass index is 32.02 kg/m².  No LMP recorded (lmp unknown). Patient has had an implant.    I&O (Last 24H):    Intake/Output Summary (Last 24 hours) at 12/21/2019 0955  Last data filed at 12/20/2019 2100  Gross per 24 hour   Intake 1800 ml   Output 1850 ml   Net -50 ml       Physical Exam:   Constitutional: She is oriented to person, place, and time. No distress.       Cardiovascular: Normal rate.     Pulmonary/Chest: Effort normal. No respiratory distress.        Abdominal: Soft. She exhibits abdominal incision (port sites c/d/i x 3). She exhibits no distension. There is no tenderness. There is no rebound and no guarding.   BS present, hypoactive              Musculoskeletal: She exhibits no edema or tenderness.   SCDs in place       Neurological: She is alert and oriented to person, place, and time.     Psychiatric: She has a normal mood and affect.       Laboratory:  CBC:   Recent Labs   Lab 12/21/19  0315   WBC 11.98   RBC 3.99*   HGB 11.0*   HCT 34.3*      MCV 86   MCH 27.6   MCHC 32.1       Diagnostic Results:  n/a    Assessment/Plan:     DUB (dysfunctional uterine bleeding)  Routine post op care s/p TLH/BSO  - Pain control: continue current meds  - Diet: regular  - Robb: s/p removal, voiding freely  - Labs: stable  - PPx: early ambulation, SCDs  - Dispo: home today          Viry Alvarez MD  Obstetrics & Gynecology  [facility]

## 2019-12-27 LAB
FINAL PATHOLOGIC DIAGNOSIS: NORMAL
GROSS: NORMAL

## 2019-12-29 NOTE — DISCHARGE SUMMARY
[facility]  Obstetrics & Gynecology  Discharge Summary    Patient Name: Bhavana Bright  MRN: [id number]  Admission Date: 12/20/2019  Hospital Length of Stay: 0 days  Discharge Date and Time: 12/21/2019  1:18 PM  Attending Physician: Sharri att. providers found   Discharging Provider: Viry Alvarez MD  Primary Care Provider: Primary Doctor Sharri    HPI:  Bhavana Bright is a 44 y.o. who presents as scheduled for surgery.    Hospital Course:  Patient presented for scheduled procedure. Patient was passed back to OR for the below mentioned procedure. Please see OP note for further details. Tolerated procedure well and patient was taken to recovery in a stable condition. Prior to discharge patient was able to void, ambulate, tolerate PO and pain was well controlled with PO meds. Patient was given routine post-op instructions for which patient voiced understanding. Patient was subsequently discharged home.      Procedure(s) (LRB):  HYSTERECTOMY, TOTAL, LAPAROSCOPIC (N/A)  SALPINGO-OOPHORECTOMY, LAPAROSCOPIC (N/A)  CYSTOSCOPY (N/A)         Significant Diagnostic Studies:   Lab Results   Component Value Date    WBC 11.98 12/21/2019    HGB 11.0 (L) 12/21/2019    HCT 34.3 (L) 12/21/2019    MCV 86 12/21/2019     12/21/2019           Pending Diagnostic Studies:     Procedure Component Value Units Date/Time    Specimen to Pathology, Surgery Gynecology and Obstetrics [[id number]] Collected:  12/20/19 1518    Order Status:  Sent Lab Status:  In process Updated:  12/23/19 0948        Final Active Diagnoses:    Diagnosis Date Noted POA    DUB (dysfunctional uterine bleeding) [N93.8] 12/20/2019 Yes      Problems Resolved During this Admission:        Discharged Condition: good    Disposition: Home or Self Care    Follow Up:  Follow-up Information     Shannon Fowler MD.    Specialties:  Obstetrics and Gynecology, Obstetrics  Why:  post-operative checkup  Contact information:  [address]  [facility] [address]  [phone]                 Patient Instructions:      Pelvic Rest     Notify your health care provider if you experience any of the following:  temperature >100.4     Notify your health care provider if you experience any of the following:  persistent nausea and vomiting or diarrhea     Notify your health care provider if you experience any of the following:  severe uncontrolled pain     Notify your health care provider if you experience any of the following:  redness, tenderness, or signs of infection (pain, swelling, redness, odor or green/yellow discharge around incision site)     Activity as tolerated     Medications:  Reconciled Home Medications:      Medication List      START taking these medications    HYDROcodone-acetaminophen 5-325 mg per tablet  Commonly known as:  NORCO  Take 1 tablet by mouth every 4 (four) hours as needed.     ibuprofen 800 MG tablet  Commonly known as:  ADVIL,MOTRIN  Take 1 tablet (800 mg total) by mouth every 6 (six) hours as needed for Other.        CONTINUE taking these medications    * estradiol  infection  Inject 2 Pellet (100 mg total) into the skin as directed.     * estradiol  infection  Inject 2 Pellet (100 mg total) into the skin as directed.     multivitamin capsule  Take 1 capsule by mouth once daily.         * This list has 2 medication(s) that are the same as other medications prescribed for you. Read the directions carefully, and ask your doctor or other care provider to review them with you.            STOP taking these medications    medroxyPROGESTERone 10 MG tablet  Commonly known as:  PROVERA            Viry Alvarez MD  Obstetrics & Gynecology  [facility]

## 2019-12-31 LAB
FINAL PATHOLOGIC DIAGNOSIS: NORMAL
GROSS: NORMAL

## 2020-01-04 ENCOUNTER — PATIENT MESSAGE (OUTPATIENT)
Dept: OBSTETRICS AND GYNECOLOGY | Facility: CLINIC | Age: 45
End: 2020-01-04

## 2020-01-10 ENCOUNTER — LAB VISIT (OUTPATIENT)
Dept: LAB | Facility: OTHER | Age: 45
End: 2020-01-10
Attending: OBSTETRICS & GYNECOLOGY
Payer: COMMERCIAL

## 2020-01-10 ENCOUNTER — PATIENT MESSAGE (OUTPATIENT)
Dept: OBSTETRICS AND GYNECOLOGY | Facility: CLINIC | Age: 45
End: 2020-01-10

## 2020-01-10 DIAGNOSIS — N95.1 SYMPTOMATIC MENOPAUSAL OR FEMALE CLIMACTERIC STATES: Primary | ICD-10-CM

## 2020-01-10 DIAGNOSIS — N95.1 SYMPTOMATIC MENOPAUSAL OR FEMALE CLIMACTERIC STATES: ICD-10-CM

## 2020-01-10 LAB
ESTRADIOL SERPL-MCNC: 178 PG/ML
TESTOST SERPL-MCNC: 7 NG/DL (ref 5–73)

## 2020-01-10 PROCEDURE — 84402 ASSAY OF FREE TESTOSTERONE: CPT

## 2020-01-10 PROCEDURE — 82670 ASSAY OF TOTAL ESTRADIOL: CPT

## 2020-01-10 PROCEDURE — 84403 ASSAY OF TOTAL TESTOSTERONE: CPT

## 2020-01-10 PROCEDURE — 36415 COLL VENOUS BLD VENIPUNCTURE: CPT

## 2020-01-14 LAB — TESTOST FREE SERPL-MCNC: 0.5 PG/ML

## 2020-01-17 ENCOUNTER — TELEPHONE (OUTPATIENT)
Dept: OBSTETRICS AND GYNECOLOGY | Facility: CLINIC | Age: 45
End: 2020-01-17

## 2020-01-20 ENCOUNTER — TELEPHONE (OUTPATIENT)
Dept: OBSTETRICS AND GYNECOLOGY | Facility: CLINIC | Age: 45
End: 2020-01-20

## 2020-01-20 NOTE — TELEPHONE ENCOUNTER
----- Message from Sonay Lopez sent at 1/20/2020 10:29 AM CST -----  Contact: DEVIN MCDANIEL   Type:  Patient Returning Call    Who Called: DEVIN MCDANIEL     Who Left Message for Patient: Consuelo Wooten MA     Does the patient know what this is regarding?:NO    Best Call Back Number:[phone]    Additional Information:

## 2020-01-22 ENCOUNTER — TELEPHONE (OUTPATIENT)
Dept: OBSTETRICS AND GYNECOLOGY | Facility: CLINIC | Age: 45
End: 2020-01-22

## 2020-01-27 ENCOUNTER — OFFICE VISIT (OUTPATIENT)
Dept: OBSTETRICS AND GYNECOLOGY | Facility: CLINIC | Age: 45
End: 2020-01-27
Payer: COMMERCIAL

## 2020-01-27 VITALS
DIASTOLIC BLOOD PRESSURE: 72 MMHG | HEIGHT: 69 IN | WEIGHT: 218.69 LBS | BODY MASS INDEX: 32.39 KG/M2 | SYSTOLIC BLOOD PRESSURE: 118 MMHG

## 2020-01-27 DIAGNOSIS — Z98.890 POST-OPERATIVE STATE: Primary | ICD-10-CM

## 2020-01-27 PROCEDURE — 99024 POSTOP FOLLOW-UP VISIT: CPT | Mod: S$GLB,,, | Performed by: OBSTETRICS & GYNECOLOGY

## 2020-01-27 PROCEDURE — 99024 PR POST-OP FOLLOW-UP VISIT: ICD-10-PCS | Mod: S$GLB,,, | Performed by: OBSTETRICS & GYNECOLOGY

## 2020-01-27 PROCEDURE — 99999 PR PBB SHADOW E&M-EST. PATIENT-LVL III: ICD-10-PCS | Mod: PBBFAC,,, | Performed by: OBSTETRICS & GYNECOLOGY

## 2020-01-27 PROCEDURE — 99999 PR PBB SHADOW E&M-EST. PATIENT-LVL III: CPT | Mod: PBBFAC,,, | Performed by: OBSTETRICS & GYNECOLOGY

## 2020-01-27 RX ORDER — TINIDAZOLE 500 MG/1
2 TABLET ORAL DAILY
Qty: 8 TABLET | Refills: 0 | Status: SHIPPED | OUTPATIENT
Start: 2020-01-27 | End: 2020-01-29

## 2020-01-27 NOTE — PROGRESS NOTES
"CC: Postop visit    Bhavana Bright is a 44 y.o. female  post-op from a TLH/BSO, cystoscopy on 2019.  Patient is Doing well postoperatively.      The pathology revealed: NORMAL.     Past Medical History:   Diagnosis Date    Abnormal Pap smear     Pregnancy resulting from in vitro fertilization     Premature ovarian failure      Past Surgical History:   Procedure Laterality Date     SECTION      COLPOSCOPY      CYSTOSCOPY N/A 2019    Procedure: CYSTOSCOPY;  Surgeon: Shannon Fowler MD;  Location: [facility];  Service: OB/GYN;  Laterality: N/A;    LAPAROSCOPIC SALPINGO-OOPHORECTOMY N/A 2019    Procedure: SALPINGO-OOPHORECTOMY, LAPAROSCOPIC;  Surgeon: Shannon Fowler MD;  Location: [facility];  Service: OB/GYN;  Laterality: N/A;    LAPAROSCOPIC TOTAL HYSTERECTOMY N/A 2019    Procedure: HYSTERECTOMY, TOTAL, LAPAROSCOPIC;  Surgeon: Shannon Fowler MD;  Location: [facility];  Service: OB/GYN;  Laterality: N/A;    VAGINAL DELIVERY      x1       /72   Ht 5' 9" (1.753 m)   Wt 99.2 kg (218 lb 11.1 oz)   BMI 32.30 kg/m²     ROS:  GENERAL: No fever, chills, fatigability or weight loss.  VULVAR: No pain, no lesions and no itching.  VAGINAL: No relaxation, no itching, no discharge, no abnormal bleeding and no lesions.  ABDOMEN: No abdominal pain. Denies nausea. Denies vomiting. No diarrhea. No constipation  BREAST: Denies pain. No lumps. No discharge.  URINARY: No incontinence, no nocturia, no frequency and no dysuria.  CARDIOVASCULAR: No chest pain. No shortness of breath. No leg cramps.  NEUROLOGICAL: No headaches. No vision changes.    PE:     PELVIC: Normal external female genitalia without lesions. Normal hair distribution. Adequate perineal body, normal urethral meatus. Vagina moist and without lesions or discharge. No significant cystocele or rectocele. Uterus and cervix surgically absent. Vaginal cuff intact and appears normal. Bimanual exam revealed no " masses, tenderness or abnormality.              ICD-10-CM ICD-9-CM    1. Post-operative state Z98.890 V45.89          No follow-ups on file.

## 2020-03-03 ENCOUNTER — TELEPHONE (OUTPATIENT)
Dept: OBSTETRICS AND GYNECOLOGY | Facility: CLINIC | Age: 45
End: 2020-03-03

## 2020-03-03 NOTE — TELEPHONE ENCOUNTER
Pt states is scheduled for pellets and has not heard from pharmacy yet for payment.  Will check and call pt back with further info when received

## 2020-03-03 NOTE — TELEPHONE ENCOUNTER
----- Message from Cristal Soto sent at 3/3/2020 10:07 AM CST -----  Contact: DEVIN MCDANIEL [[id number]]  Name of Who is Calling: DEVIN MCDANIEL [[id number]]      What is the request in detail: Pt is calling to speak to staff in regards to an update in regards to her pellets .... Please call to further assist .  HELLEN      Can the clinic reply by [facility]:  N      What Number to Call Back if not in [facility]: [phone]

## 2020-03-16 ENCOUNTER — TELEPHONE (OUTPATIENT)
Dept: OBSTETRICS AND GYNECOLOGY | Facility: CLINIC | Age: 45
End: 2020-03-16

## 2020-03-30 ENCOUNTER — TELEPHONE (OUTPATIENT)
Dept: OBSTETRICS AND GYNECOLOGY | Facility: CLINIC | Age: 45
End: 2020-03-30

## 2020-03-30 NOTE — TELEPHONE ENCOUNTER
----- Message from Anne Soto, Patient Care Assistant sent at 3/30/2020  9:19 AM CDT -----  Contact: DEVIN MCDANIEL [[id number]]  Name of Who is Calling: DEVIN MCDANIEL [[id number]]    What is the request in detail:Requesting a call back in regards of rescheduling procedure. Please contact to further discuss and advise      Can the clinic reply by [facility]: No    [facility]:   [phone]

## 2020-04-01 ENCOUNTER — TELEPHONE (OUTPATIENT)
Dept: OBSTETRICS AND GYNECOLOGY | Facility: CLINIC | Age: 45
End: 2020-04-01

## 2020-04-01 NOTE — TELEPHONE ENCOUNTER
Spoke to patient. She states she would like to postpone pellet insertion due to COVID concerns. She would also like to know when her pellet prescription expires.

## 2020-04-01 NOTE — TELEPHONE ENCOUNTER
----- Message from Juno Martínez MA sent at 4/1/2020  4:37 PM CDT -----  Contact: PT  Pt would like to discuss resched her pellet visit.    Ms. Bateman can be reached @ [phone]

## 2020-04-20 ENCOUNTER — TELEPHONE (OUTPATIENT)
Dept: OBSTETRICS AND GYNECOLOGY | Facility: CLINIC | Age: 45
End: 2020-04-20

## 2020-04-20 NOTE — TELEPHONE ENCOUNTER
----- Message from Teri Tang RN sent at 2020 10:12 AM CDT -----  Regarding: Pellets  Hi Nidia,     Patient's Estradiol 50mg has , I do have PAR to substitute for her given COVID-19. Her testosterone 100mg will  2020.     Kindest Regards,   Teri Tang, RN, BSN  RN Navigator   [facility]  [phone]

## 2020-04-27 ENCOUNTER — PATIENT MESSAGE (OUTPATIENT)
Dept: OBSTETRICS AND GYNECOLOGY | Facility: CLINIC | Age: 45
End: 2020-04-27

## 2020-05-01 ENCOUNTER — PROCEDURE VISIT (OUTPATIENT)
Dept: OBSTETRICS AND GYNECOLOGY | Facility: CLINIC | Age: 45
End: 2020-05-01

## 2020-05-01 DIAGNOSIS — E28.39 PREMATURE OVARIAN FAILURE: Primary | ICD-10-CM

## 2020-05-01 DIAGNOSIS — E89.40 SURGICAL MENOPAUSE: ICD-10-CM

## 2020-05-01 PROCEDURE — 11980 PR IMPLANT,HORMONE,SUBCUTANEOUS: ICD-10-PCS | Mod: CSM,S$GLB,, | Performed by: OBSTETRICS & GYNECOLOGY

## 2020-05-01 PROCEDURE — 11980 IMPLANT HORMONE PELLET(S): CPT | Mod: CSM,S$GLB,, | Performed by: OBSTETRICS & GYNECOLOGY

## 2020-05-01 NOTE — PROCEDURES
Procedures     Pellet and Injection    Chief Complaint: Hormone Deficiency    History of Present Illness: 44 y.o. female patient presents with menopausal symptoms.    Interval Changes: adequate response to the pellets    Procedure: Subcutaneous hormone pellet implantation with local anesthesia of 10 cc Xylocaine 1%.    Site: Buttocks    Sutures Used: yes    Pellets Inserted:  Testosterone 100 mg x 2  Estradiol 50 mg x 2  In the right Buttocks    Intramuscular Injection: None    Follow up with me in 6 months for pellet insertion.     Shannon Fowler MD 05/01/2020 9:05 AM

## 2020-07-12 ENCOUNTER — PATIENT MESSAGE (OUTPATIENT)
Dept: OBSTETRICS AND GYNECOLOGY | Facility: CLINIC | Age: 45
End: 2020-07-12

## 2020-07-12 DIAGNOSIS — Z12.11 COLON CANCER SCREENING: Primary | ICD-10-CM

## 2020-07-22 ENCOUNTER — TELEPHONE (OUTPATIENT)
Dept: ENDOSCOPY | Facility: HOSPITAL | Age: 45
End: 2020-07-22

## 2020-07-22 NOTE — TELEPHONE ENCOUNTER
Ambulatory referral for colonoscopy received. Called patient to schedule procedure. Patient stated she wants to schedule colonoscopy on the [facility]. [facility] endoscopy number given to patient.

## 2020-07-30 ENCOUNTER — TELEPHONE (OUTPATIENT)
Dept: OBSTETRICS AND GYNECOLOGY | Facility: CLINIC | Age: 45
End: 2020-07-30

## 2020-07-30 DIAGNOSIS — Z12.11 COLON CANCER SCREENING: Primary | ICD-10-CM

## 2020-07-30 NOTE — TELEPHONE ENCOUNTER
----- Message from Tg Rodriguez MA sent at 7/30/2020  1:24 PM CDT -----  Regarding: FW: colonoscopy orders need    ----- Message -----  From: Pilar Hardin  Sent: 7/30/2020  12:22 PM CDT  To: Janeth DERAS Staff  Subject: colonoscopy orders need                          Dear ,    Patient wants the [facility] office to do colonoscopy, however [facility] cancel the case, So can you please put new orders in and place it under [facility]. Thanks        Sincerely,  [facility]   [phone]

## 2020-08-03 ENCOUNTER — PATIENT MESSAGE (OUTPATIENT)
Dept: OBSTETRICS AND GYNECOLOGY | Facility: CLINIC | Age: 45
End: 2020-08-03

## 2020-08-03 DIAGNOSIS — Z12.11 COLON CANCER SCREENING: Primary | ICD-10-CM

## 2020-08-06 ENCOUNTER — TELEPHONE (OUTPATIENT)
Dept: OBSTETRICS AND GYNECOLOGY | Facility: CLINIC | Age: 45
End: 2020-08-06

## 2020-08-06 NOTE — TELEPHONE ENCOUNTER
----- Message from Dahiana March LPN sent at 8/5/2020 10:59 AM CDT -----  Regarding: Colonoscopy scheduling      08/05/2020  Medical Record # [id number]     Dear Dr Fowler,    An order for a/an, colonoscopy, was placed for Bhavana Bright.  The H&P is greater than six months. Your client requires an updated H&P before the procedure can be scheduled.       Sincerely,  Dahiana March LPN

## 2020-10-09 ENCOUNTER — PATIENT MESSAGE (OUTPATIENT)
Dept: OBSTETRICS AND GYNECOLOGY | Facility: CLINIC | Age: 45
End: 2020-10-09

## 2020-10-09 DIAGNOSIS — Z12.39 ENCOUNTER FOR SCREENING FOR MALIGNANT NEOPLASM OF BREAST, UNSPECIFIED SCREENING MODALITY: Primary | ICD-10-CM

## 2020-11-11 ENCOUNTER — OFFICE VISIT (OUTPATIENT)
Dept: OPTOMETRY | Facility: CLINIC | Age: 45
End: 2020-11-11
Payer: COMMERCIAL

## 2020-11-11 DIAGNOSIS — H52.4 PRESBYOPIA: ICD-10-CM

## 2020-11-11 DIAGNOSIS — Z01.00 EXAMINATION OF EYES AND VISION: Primary | ICD-10-CM

## 2020-11-11 PROCEDURE — 99999 PR PBB SHADOW E&M-EST. PATIENT-LVL I: ICD-10-PCS | Mod: PBBFAC,,, | Performed by: OPTOMETRIST

## 2020-11-11 PROCEDURE — 92014 COMPRE OPH EXAM EST PT 1/>: CPT | Mod: S$GLB,,, | Performed by: OPTOMETRIST

## 2020-11-11 PROCEDURE — 99999 PR PBB SHADOW E&M-EST. PATIENT-LVL I: CPT | Mod: PBBFAC,,, | Performed by: OPTOMETRIST

## 2020-11-11 PROCEDURE — 92014 PR EYE EXAM, EST PATIENT,COMPREHESV: ICD-10-PCS | Mod: S$GLB,,, | Performed by: OPTOMETRIST

## 2020-11-11 NOTE — PROGRESS NOTES
Subjective:       Patient ID: Bhavana Bright is a 45 y.o. female      Chief Complaint   Patient presents with    Concerns About Ocular Health     History of Present Illness  Dls: 11/22/17 Dr. Keys     46 y/o female presents today with c/o blurry vision at near ou.   Pt c/o problems with lights when driving at night.   Pt wears otc readers.     No tearing  + itching  No burning  No pain  No ha's   No floaters  No flashes    Eye meds  otc gtts ou prn          Assessment/Plan:     1. Examination of eyes and vision  [facility] vision    2. Presbyopia  Declined MRx. Readers PRN.    Follow up in about 2 years (around 11/11/2022).

## 2020-11-13 ENCOUNTER — LAB VISIT (OUTPATIENT)
Dept: FAMILY MEDICINE | Facility: CLINIC | Age: 45
End: 2020-11-13
Payer: COMMERCIAL

## 2020-11-13 ENCOUNTER — PATIENT MESSAGE (OUTPATIENT)
Dept: ADMINISTRATIVE | Facility: HOSPITAL | Age: 45
End: 2020-11-13

## 2020-11-13 DIAGNOSIS — Z12.11 COLON CANCER SCREENING: ICD-10-CM

## 2020-11-13 PROCEDURE — U0003 INFECTIOUS AGENT DETECTION BY NUCLEIC ACID (DNA OR RNA); SEVERE ACUTE RESPIRATORY SYNDROME CORONAVIRUS 2 (SARS-COV-2) (CORONAVIRUS DISEASE [COVID-19]), AMPLIFIED PROBE TECHNIQUE, MAKING USE OF HIGH THROUGHPUT TECHNOLOGIES AS DESCRIBED BY CMS-2020-01-R: HCPCS

## 2020-11-13 RX ORDER — SODIUM, POTASSIUM,MAG SULFATES 17.5-3.13G
1 SOLUTION, RECONSTITUTED, ORAL ORAL DAILY
Qty: 1 KIT | Refills: 0 | Status: ON HOLD | OUTPATIENT
Start: 2020-11-13 | End: 2020-11-16 | Stop reason: HOSPADM

## 2020-11-14 LAB — SARS-COV-2 RNA RESP QL NAA+PROBE: NOT DETECTED

## 2020-11-15 ENCOUNTER — ANESTHESIA EVENT (OUTPATIENT)
Dept: ENDOSCOPY | Facility: HOSPITAL | Age: 45
End: 2020-11-15
Payer: COMMERCIAL

## 2020-11-16 ENCOUNTER — ANESTHESIA (OUTPATIENT)
Dept: ENDOSCOPY | Facility: HOSPITAL | Age: 45
End: 2020-11-16
Payer: COMMERCIAL

## 2020-11-16 ENCOUNTER — HOSPITAL ENCOUNTER (OUTPATIENT)
Facility: HOSPITAL | Age: 45
Discharge: HOME | End: 2020-11-16
Attending: INTERNAL MEDICINE | Admitting: INTERNAL MEDICINE
Payer: COMMERCIAL

## 2020-11-16 VITALS
OXYGEN SATURATION: 98 % | TEMPERATURE: 98 F | DIASTOLIC BLOOD PRESSURE: 60 MMHG | HEIGHT: 69 IN | WEIGHT: 190 LBS | HEART RATE: 60 BPM | BODY MASS INDEX: 28.14 KG/M2 | SYSTOLIC BLOOD PRESSURE: 135 MMHG | RESPIRATION RATE: 18 BRPM

## 2020-11-16 DIAGNOSIS — Z12.11 COLON CANCER SCREENING: ICD-10-CM

## 2020-11-16 PROCEDURE — D9220A PRA ANESTHESIA: Mod: 33,CRNA,, | Performed by: NURSE ANESTHETIST, CERTIFIED REGISTERED

## 2020-11-16 PROCEDURE — 45380 COLONOSCOPY AND BIOPSY: CPT | Performed by: INTERNAL MEDICINE

## 2020-11-16 PROCEDURE — 63600175 PHARM REV CODE 636 W HCPCS: Performed by: NURSE ANESTHETIST, CERTIFIED REGISTERED

## 2020-11-16 PROCEDURE — 25000003 PHARM REV CODE 250: Performed by: NURSE ANESTHETIST, CERTIFIED REGISTERED

## 2020-11-16 PROCEDURE — 37000008 HC ANESTHESIA 1ST 15 MINUTES: Performed by: INTERNAL MEDICINE

## 2020-11-16 PROCEDURE — D9220A PRA ANESTHESIA: ICD-10-PCS | Mod: 33,CRNA,, | Performed by: NURSE ANESTHETIST, CERTIFIED REGISTERED

## 2020-11-16 PROCEDURE — 27201012 HC FORCEPS, HOT/COLD, DISP: Performed by: INTERNAL MEDICINE

## 2020-11-16 PROCEDURE — 45380 PR COLONOSCOPY,BIOPSY: ICD-10-PCS | Mod: 33,,, | Performed by: INTERNAL MEDICINE

## 2020-11-16 PROCEDURE — 25000003 PHARM REV CODE 250: Performed by: ANESTHESIOLOGY

## 2020-11-16 PROCEDURE — D9220A PRA ANESTHESIA: Mod: 33,ANES,, | Performed by: ANESTHESIOLOGY

## 2020-11-16 PROCEDURE — 37000009 HC ANESTHESIA EA ADD 15 MINS: Performed by: INTERNAL MEDICINE

## 2020-11-16 PROCEDURE — 88305 TISSUE EXAM BY PATHOLOGIST: CPT | Mod: 59 | Performed by: PATHOLOGY

## 2020-11-16 PROCEDURE — 88305 TISSUE EXAM BY PATHOLOGIST: ICD-10-PCS | Mod: 26,,, | Performed by: PATHOLOGY

## 2020-11-16 PROCEDURE — 88305 TISSUE EXAM BY PATHOLOGIST: CPT | Mod: 26,,, | Performed by: PATHOLOGY

## 2020-11-16 PROCEDURE — D9220A PRA ANESTHESIA: ICD-10-PCS | Mod: 33,ANES,, | Performed by: ANESTHESIOLOGY

## 2020-11-16 PROCEDURE — 45380 COLONOSCOPY AND BIOPSY: CPT | Mod: 33,,, | Performed by: INTERNAL MEDICINE

## 2020-11-16 RX ORDER — LIDOCAINE HYDROCHLORIDE 10 MG/ML
1 INJECTION, SOLUTION EPIDURAL; INFILTRATION; INTRACAUDAL; PERINEURAL ONCE
Status: DISCONTINUED | OUTPATIENT
Start: 2020-11-16 | End: 2020-11-16 | Stop reason: HOSPADM

## 2020-11-16 RX ORDER — PROPOFOL 10 MG/ML
INJECTION, EMULSION INTRAVENOUS
Status: DISCONTINUED
Start: 2020-11-16 | End: 2020-11-16 | Stop reason: HOSPADM

## 2020-11-16 RX ORDER — LIDOCAINE HYDROCHLORIDE 20 MG/ML
INJECTION INTRAVENOUS
Status: DISCONTINUED | OUTPATIENT
Start: 2020-11-16 | End: 2020-11-16

## 2020-11-16 RX ORDER — SODIUM CHLORIDE 9 MG/ML
INJECTION, SOLUTION INTRAVENOUS CONTINUOUS
Status: CANCELLED | OUTPATIENT
Start: 2020-11-16

## 2020-11-16 RX ORDER — PROPOFOL 10 MG/ML
VIAL (ML) INTRAVENOUS
Status: DISCONTINUED | OUTPATIENT
Start: 2020-11-16 | End: 2020-11-16

## 2020-11-16 RX ORDER — SODIUM CHLORIDE 9 MG/ML
INJECTION, SOLUTION INTRAVENOUS CONTINUOUS
Status: DISCONTINUED | OUTPATIENT
Start: 2020-11-16 | End: 2020-11-16 | Stop reason: HOSPADM

## 2020-11-16 RX ORDER — LIDOCAINE HYDROCHLORIDE 20 MG/ML
INJECTION, SOLUTION EPIDURAL; INFILTRATION; INTRACAUDAL; PERINEURAL
Status: DISCONTINUED
Start: 2020-11-16 | End: 2020-11-16 | Stop reason: HOSPADM

## 2020-11-16 RX ADMIN — PROPOFOL 40 MG: 10 INJECTION, EMULSION INTRAVENOUS at 10:11

## 2020-11-16 RX ADMIN — PROPOFOL 20 MG: 10 INJECTION, EMULSION INTRAVENOUS at 10:11

## 2020-11-16 RX ADMIN — Medication 100 MG: at 10:11

## 2020-11-16 RX ADMIN — SODIUM CHLORIDE: 0.9 INJECTION, SOLUTION INTRAVENOUS at 09:11

## 2020-11-16 RX ADMIN — PROPOFOL 80 MG: 10 INJECTION, EMULSION INTRAVENOUS at 10:11

## 2020-11-16 NOTE — DISCHARGE INSTRUCTIONS

## 2020-11-16 NOTE — H&P
[facility]  Gastroenterology  H&P    Patient Name: Bhavana Bright  MRN: [id number]  Admission Date: 2020  Code Status: Prior    Attending Provider: golden dempsey  Primary Care Physician: Primary Doctor No  Principal Problem:<principal problem not specified>    Subjective:     History of Present Illness: colon cancer screen    Past Medical History:   Diagnosis Date    Abnormal Pap smear     Pregnancy resulting from in vitro fertilization     Premature ovarian failure        Past Surgical History:   Procedure Laterality Date     SECTION      COLPOSCOPY      CYSTOSCOPY N/A 2019    Procedure: CYSTOSCOPY;  Surgeon: Shannon Fowler MD;  Location: [facility];  Service: OB/GYN;  Laterality: N/A;    LAPAROSCOPIC SALPINGO-OOPHORECTOMY N/A 2019    Procedure: SALPINGO-OOPHORECTOMY, LAPAROSCOPIC;  Surgeon: Shannon Fowler MD;  Location: [facility];  Service: OB/GYN;  Laterality: N/A;    LAPAROSCOPIC TOTAL HYSTERECTOMY N/A 2019    Procedure: HYSTERECTOMY, TOTAL, LAPAROSCOPIC;  Surgeon: Shannon Fowler MD;  Location: [facility];  Service: OB/GYN;  Laterality: N/A;    VAGINAL DELIVERY      x1       Review of patient's allergies indicates:  No Known Allergies  Family History     Problem Relation (Age of Onset)    Arthritis Maternal Grandmother, Maternal Grandfather    Blindness Maternal Grandfather    Cancer Father    Cataracts Father, Mother    Diabetes Maternal Grandfather, Father, Mother    Glaucoma Maternal Grandfather    Heart disease Paternal Grandfather, Maternal Grandfather    Hyperlipidemia Maternal Grandmother, Mother    Hypertension Father    Macular degeneration Maternal Grandmother    No Known Problems Sister, Brother, Maternal Aunt, Maternal Uncle, Paternal Aunt, Paternal Uncle, Paternal Grandmother        Tobacco Use    Smoking status: Never Smoker    Smokeless tobacco: Never Used   Substance and Sexual Activity    Alcohol use: No    Drug use: No     Sexual activity: Yes     Partners: Male     Review of Systems   Respiratory: Negative.    Cardiovascular: Negative.      Objective:     Vital Signs (Most Recent):  Temp: 97.6 °F (36.4 °C) (11/16/20 0933)  Pulse: (!) 57 (11/16/20 0933)  Resp: 18 (11/16/20 0933)  BP: (!) 124/59 (11/16/20 0933)  SpO2: 100 % (11/16/20 0933) Vital Signs (24h Range):  Temp:  [97.6 °F (36.4 °C)] 97.6 °F (36.4 °C)  Pulse:  [57] 57  Resp:  [18] 18  SpO2:  [100 %] 100 %  BP: (124)/(59) 124/59     Weight: 86.2 kg (190 lb) (11/16/20 0933)  Body mass index is 28.06 kg/m².    No intake or output data in the 24 hours ending 11/16/20 1010    Lines/Drains/Airways     Peripheral Intravenous Line                 Peripheral IV - Single Lumen 12/20/19 1014 18 G Left Antecubital 331 days         Peripheral IV - Single Lumen 11/16/20 0943 22 G Right Antecubital less than 1 day                Physical Exam  Cardiovascular:      Rate and Rhythm: Normal rate and regular rhythm.   Pulmonary:      Effort: Pulmonary effort is normal.      Breath sounds: Normal breath sounds.         Significant Labs:      Significant Imaging:      Assessment/Plan:     Active Diagnoses:    Diagnosis Date Noted POA    Colon cancer screening [Z12.11] 11/16/2020 Not Applicable      Problems Resolved During this Admission:       Indication for procedure:    ASA:II  Airway normal  Malampati class:per anes    Personal and family history negative for anesthesia problems    Plan:colon  Anesthesia plan: general      Josue Pickett MD  Gastroenterology  [facility]

## 2020-11-16 NOTE — ANESTHESIA PREPROCEDURE EVALUATION
11/16/2020  Bhavana Bright is a 45 y.o., female.    Anesthesia Evaluation     I have reviewed the Nursing Notes.       Review of Systems  Anesthesia Hx:  No problems with previous Anesthesia   Social:  Non-Smoker    Cardiovascular:  Cardiovascular Normal Exercise tolerance: good     Pulmonary:  Pulmonary Normal    Renal/:  Renal/ Normal     Hepatic/GI:   Bowel Prep. Denies PUD. Denies Hiatal Hernia.  Denies GERD. Denies Liver Disease.  Denies Hepatitis.    Neurological:  Neurology Normal    Endocrine:  Endocrine Normal        Physical Exam  General:  Well nourished    Airway/Jaw/Neck:  AIRWAY FINDINGS: Normal      Chest/Lungs:  Chest/Lungs Clear    Heart/Vascular:  Heart Findings: Normal       Mental Status:  Mental Status Findings: Normal        Anesthesia Plan  Type of Anesthesia, risks & benefits discussed:  Anesthesia Type:  general  Patient's Preference:   Intra-op Monitoring Plan: standard ASA monitors  Intra-op Monitoring Plan Comments:   Post Op Pain Control Plan:   Post Op Pain Control Plan Comments:   Induction:   IV  Beta Blocker:  Patient is not currently on a Beta-Blocker (No further documentation required).       Informed Consent: Patient understands risks and agrees with Anesthesia plan.  Questions answered. Anesthesia consent signed with patient.  ASA Score: 2     Day of Surgery Review of History & Physical:  There are no significant changes.  H&P update referred to the provider.         Ready For Surgery From Anesthesia Perspective.

## 2020-11-16 NOTE — [employer] PATIENT INSTRUCTIONS
Discharge Summary/Instructions after an Endoscopic Procedure  Patient Name: Bhavana Bright  Patient MRN: [id number]  Patient YOB: 1975 Monday, November 16, 2020  Josue Pickett MD  RESTRICTIONS:  During your procedure today, you received medications for sedation.  These   medications may affect your judgment, balance and coordination.  Therefore,   for 24 hours, you have the following restrictions:   - DO NOT drive a car, operate machinery, make legal/financial decisions,   sign important papers or drink alcohol.    ACTIVITY:  Today: no heavy lifting, straining or running due to procedural   sedation/anesthesia.  The following day: return to full activity including work.  DIET:  Eat and drink normally unless instructed otherwise.     TREATMENT FOR COMMON SIDE EFFECTS:  - Mild abdominal pain, nausea, belching, bloating or excessive gas:  rest,   eat lightly and use a heating pad.  - Sore Throat: treat with throat lozenges and/or gargle with warm salt   water.  - Because air was used during the procedure, expelling large amounts of air   from your rectum or belching is normal.  - If a bowel prep was taken, you may not have a bowel movement for 1-3 days.    This is normal.  SYMPTOMS TO WATCH FOR AND REPORT TO YOUR PHYSICIAN:  1. Abdominal pain or bloating, other than gas cramps.  2. Chest pain.  3. Back pain.  4. Signs of infection such as: chills or fever occurring within 24 hours   after the procedure.  5. Rectal bleeding, which would show as bright red, maroon, or black stools.   (A tablespoon of blood from the rectum is not serious, especially if   hemorrhoids are present.)  6. Vomiting.  7. Weakness or dizziness.  GO DIRECTLY TO THE NEAREST EMERGENCY ROOM IF YOU HAVE ANY OF THE FOLLOWING:      Difficulty breathing              Chills and/or fever over 101 F   Persistent vomiting and/or vomiting blood   Severe abdominal pain   Severe chest pain   Black, tarry stools   Bleeding- more than one tablespoon   Any other  symptom or condition that you feel may need urgent attention  Your doctor recommends these additional instructions:  If any biopsies were taken, your doctors clinic will contact you in 1 to 2   weeks with any results.  - Patient has a contact number available for emergencies.  The signs and   symptoms of potential delayed complications were discussed with the   patient.  Return to normal activities tomorrow.  Written discharge   instructions were provided to the patient.   - Discharge patient to home (ambulatory).   - Resume previous diet.   - Continue present medications.   - Await pathology results.   - Repeat colonoscopy in 10 years for surveillance.  For questions, problems or results please call your physician - Josue Pickett MD at Work:  [phone].  [facility] can be reached at [phone]     IF A COMPLICATION OR EMERGENCY SITUATION ARISES AND YOU ARE UNABLE TO REACH   YOUR PHYSICIAN - GO DIRECTLY TO THE EMERGENCY ROOM.  Josue Pickett MD  11/16/2020 10:34:10 AM  This report has been verified and signed electronically.  [employer]

## 2020-11-18 LAB
FINAL PATHOLOGIC DIAGNOSIS: NORMAL
GROSS: NORMAL
Lab: NORMAL

## 2020-11-19 ENCOUNTER — HOSPITAL ENCOUNTER (OUTPATIENT)
Dept: RADIOLOGY | Facility: OTHER | Age: 45
Discharge: HOME | End: 2020-11-19
Attending: OBSTETRICS & GYNECOLOGY
Payer: COMMERCIAL

## 2020-11-19 DIAGNOSIS — Z12.31 BREAST CANCER SCREENING BY MAMMOGRAM: ICD-10-CM

## 2020-11-19 DIAGNOSIS — Z12.39 ENCOUNTER FOR SCREENING FOR MALIGNANT NEOPLASM OF BREAST, UNSPECIFIED SCREENING MODALITY: ICD-10-CM

## 2020-11-19 PROCEDURE — 77063 MAMMO DIGITAL SCREENING BILAT WITH TOMO: ICD-10-PCS | Mod: 26,,, | Performed by: INTERNAL MEDICINE

## 2020-11-19 PROCEDURE — 77063 BREAST TOMOSYNTHESIS BI: CPT | Mod: 26,,, | Performed by: INTERNAL MEDICINE

## 2020-11-19 PROCEDURE — 77067 SCR MAMMO BI INCL CAD: CPT | Mod: TC

## 2020-11-19 PROCEDURE — 77067 SCR MAMMO BI INCL CAD: CPT | Mod: 26,,, | Performed by: INTERNAL MEDICINE

## 2020-11-19 PROCEDURE — 77067 MAMMO DIGITAL SCREENING BILAT WITH TOMO: ICD-10-PCS | Mod: 26,,, | Performed by: INTERNAL MEDICINE

## 2020-11-20 NOTE — ANESTHESIA POSTPROCEDURE EVALUATION
Anesthesia Post Evaluation    Patient: Bhavana Bright    Procedure(s) Performed: Procedure(s) (LRB):  COLONOSCOPY (N/A)    Final Anesthesia Type: general    Patient location during evaluation: GI PACU  Patient participation: Yes- Able to Participate  Level of consciousness: awake and alert  Post-procedure vital signs: reviewed and stable  Pain management: adequate  Airway patency: patent    PONV status at discharge: No PONV  Anesthetic complications: no      Cardiovascular status: blood pressure returned to baseline and hemodynamically stable  Respiratory status: unassisted and spontaneous ventilation  Hydration status: euvolemic  Follow-up not needed.          Vitals Value Taken Time   /60 11/16/20 1107   Temp 36.5 °C (97.7 °F) 11/16/20 1037   Pulse 60 11/16/20 1107   Resp 18 11/16/20 1107   SpO2 98 % 11/16/20 1107         Event Time   Out of Recovery 11:17:48         Pain/Valentina Score: No data recorded

## 2020-11-27 ENCOUNTER — PATIENT MESSAGE (OUTPATIENT)
Dept: OBSTETRICS AND GYNECOLOGY | Facility: CLINIC | Age: 45
End: 2020-11-27

## 2021-04-11 ENCOUNTER — PATIENT MESSAGE (OUTPATIENT)
Dept: OBSTETRICS AND GYNECOLOGY | Facility: CLINIC | Age: 46
End: 2021-04-11

## 2021-04-11 DIAGNOSIS — E28.39 PREMATURE OVARIAN FAILURE: ICD-10-CM

## 2021-04-11 DIAGNOSIS — Z12.39 ENCOUNTER FOR SCREENING FOR MALIGNANT NEOPLASM OF BREAST, UNSPECIFIED SCREENING MODALITY: Primary | ICD-10-CM

## 2021-04-14 ENCOUNTER — PATIENT MESSAGE (OUTPATIENT)
Dept: OBSTETRICS AND GYNECOLOGY | Facility: CLINIC | Age: 46
End: 2021-04-14

## 2021-04-15 ENCOUNTER — LAB VISIT (OUTPATIENT)
Dept: LAB | Facility: OTHER | Age: 46
End: 2021-04-15
Attending: OBSTETRICS & GYNECOLOGY
Payer: COMMERCIAL

## 2021-04-15 DIAGNOSIS — E28.39 PREMATURE OVARIAN FAILURE: ICD-10-CM

## 2021-04-15 LAB — ESTRADIOL SERPL-MCNC: 105 PG/ML

## 2021-04-15 PROCEDURE — 84402 ASSAY OF FREE TESTOSTERONE: CPT | Performed by: OBSTETRICS & GYNECOLOGY

## 2021-04-15 PROCEDURE — 82670 ASSAY OF TOTAL ESTRADIOL: CPT | Performed by: OBSTETRICS & GYNECOLOGY

## 2021-04-15 PROCEDURE — 36415 COLL VENOUS BLD VENIPUNCTURE: CPT | Performed by: OBSTETRICS & GYNECOLOGY

## 2021-04-20 LAB — TESTOST FREE SERPL-MCNC: <0.4 PG/ML

## 2021-04-29 ENCOUNTER — PATIENT MESSAGE (OUTPATIENT)
Dept: OBSTETRICS AND GYNECOLOGY | Facility: CLINIC | Age: 46
End: 2021-04-29

## 2021-06-08 ENCOUNTER — PROCEDURE VISIT (OUTPATIENT)
Dept: OBSTETRICS AND GYNECOLOGY | Facility: CLINIC | Age: 46
End: 2021-06-08

## 2021-06-08 DIAGNOSIS — E28.39 PREMATURE OVARIAN FAILURE: Primary | ICD-10-CM

## 2021-06-08 PROCEDURE — 11980 IMPLANT HORMONE PELLET(S): CPT | Mod: CSM,S$GLB,, | Performed by: OBSTETRICS & GYNECOLOGY

## 2021-06-08 PROCEDURE — 11980 PR IMPLANT,HORMONE,SUBCUTANEOUS: ICD-10-PCS | Mod: CSM,S$GLB,, | Performed by: OBSTETRICS & GYNECOLOGY

## 2021-06-08 PROCEDURE — 99499 UNLISTED E&M SERVICE: CPT | Mod: S$GLB,,, | Performed by: OBSTETRICS & GYNECOLOGY

## 2021-06-08 PROCEDURE — 99499 NO LOS: ICD-10-PCS | Mod: S$GLB,,, | Performed by: OBSTETRICS & GYNECOLOGY

## 2021-10-20 ENCOUNTER — TELEPHONE (OUTPATIENT)
Dept: OBSTETRICS AND GYNECOLOGY | Facility: CLINIC | Age: 46
End: 2021-10-20

## 2021-10-20 DIAGNOSIS — Z12.31 VISIT FOR SCREENING MAMMOGRAM: Primary | ICD-10-CM

## 2021-11-23 ENCOUNTER — HOSPITAL ENCOUNTER (OUTPATIENT)
Dept: RADIOLOGY | Facility: OTHER | Age: 46
Discharge: HOME | End: 2021-11-23
Attending: OBSTETRICS & GYNECOLOGY
Payer: COMMERCIAL

## 2021-11-23 VITALS — HEIGHT: 70 IN | WEIGHT: 185.19 LBS | BODY MASS INDEX: 26.51 KG/M2

## 2021-11-23 DIAGNOSIS — Z12.31 VISIT FOR SCREENING MAMMOGRAM: ICD-10-CM

## 2021-11-23 PROCEDURE — 77067 SCR MAMMO BI INCL CAD: CPT | Mod: 26,,, | Performed by: RADIOLOGY

## 2021-11-23 PROCEDURE — 77067 SCR MAMMO BI INCL CAD: CPT | Mod: TC

## 2021-11-23 PROCEDURE — 77063 MAMMO DIGITAL SCREENING BILAT WITH TOMO: ICD-10-PCS | Mod: 26,,, | Performed by: RADIOLOGY

## 2021-11-23 PROCEDURE — 77067 MAMMO DIGITAL SCREENING BILAT WITH TOMO: ICD-10-PCS | Mod: 26,,, | Performed by: RADIOLOGY

## 2021-11-23 PROCEDURE — 77063 BREAST TOMOSYNTHESIS BI: CPT | Mod: 26,,, | Performed by: RADIOLOGY

## 2021-11-24 ENCOUNTER — PATIENT MESSAGE (OUTPATIENT)
Dept: OBSTETRICS AND GYNECOLOGY | Facility: CLINIC | Age: 46
End: 2021-11-24
Payer: COMMERCIAL

## 2021-12-13 ENCOUNTER — HOSPITAL ENCOUNTER (OUTPATIENT)
Dept: RADIOLOGY | Facility: OTHER | Age: 46
Discharge: HOME | End: 2021-12-13
Attending: OBSTETRICS & GYNECOLOGY
Payer: COMMERCIAL

## 2021-12-13 DIAGNOSIS — R92.8 ABNORMAL MAMMOGRAM: ICD-10-CM

## 2021-12-13 PROCEDURE — 77061 BREAST TOMOSYNTHESIS UNI: CPT | Mod: TC,RT

## 2021-12-13 PROCEDURE — 77065 MAMMO DIGITAL DIAGNOSTIC RIGHT WITH TOMO: ICD-10-PCS | Mod: 26,RT,, | Performed by: RADIOLOGY

## 2021-12-13 PROCEDURE — G0279 MAMMO DIGITAL DIAGNOSTIC RIGHT WITH TOMO: ICD-10-PCS | Mod: 26,RT,, | Performed by: RADIOLOGY

## 2021-12-13 PROCEDURE — 77065 DX MAMMO INCL CAD UNI: CPT | Mod: 26,RT,, | Performed by: RADIOLOGY

## 2021-12-13 PROCEDURE — G0279 TOMOSYNTHESIS, MAMMO: HCPCS | Mod: 26,RT,, | Performed by: RADIOLOGY

## 2022-07-07 ENCOUNTER — TELEPHONE (OUTPATIENT)
Dept: OBSTETRICS AND GYNECOLOGY | Facility: CLINIC | Age: 47
End: 2022-07-07
Payer: COMMERCIAL

## 2022-07-07 ENCOUNTER — PATIENT MESSAGE (OUTPATIENT)
Dept: OBSTETRICS AND GYNECOLOGY | Facility: CLINIC | Age: 47
End: 2022-07-07
Payer: COMMERCIAL

## 2022-07-07 DIAGNOSIS — E28.39 PREMATURE OVARIAN FAILURE: Primary | ICD-10-CM

## 2022-07-07 NOTE — TELEPHONE ENCOUNTER
----- Message from Jamilah Zhu MA sent at 7/5/2022 10:11 AM CDT -----  Patient needs to schedule for pellets. Didn't want to mess with the schedule.   ----- Message -----  From: Arminda Marie  Sent: 7/5/2022  10:06 AM CDT  To: Janeth DERAS Staff    Pt is calling to schld a procedure.. I am unable to schld that for her. Pt can be reached at [phone]

## 2022-07-08 ENCOUNTER — PATIENT MESSAGE (OUTPATIENT)
Dept: OBSTETRICS AND GYNECOLOGY | Facility: CLINIC | Age: 47
End: 2022-07-08
Payer: COMMERCIAL

## 2022-07-11 ENCOUNTER — LAB VISIT (OUTPATIENT)
Dept: LAB | Facility: OTHER | Age: 47
End: 2022-07-11
Attending: OBSTETRICS & GYNECOLOGY
Payer: COMMERCIAL

## 2022-07-11 DIAGNOSIS — E28.39 PREMATURE OVARIAN FAILURE: ICD-10-CM

## 2022-07-11 LAB
ALBUMIN SERPL BCP-MCNC: 4 G/DL (ref 3.5–5.2)
ALP SERPL-CCNC: 74 U/L (ref 55–135)
ALT SERPL W/O P-5'-P-CCNC: 21 U/L (ref 10–44)
ANION GAP SERPL CALC-SCNC: 11 MMOL/L (ref 8–16)
AST SERPL-CCNC: 16 U/L (ref 10–40)
BASOPHILS # BLD AUTO: 0.02 K/UL (ref 0–0.2)
BASOPHILS NFR BLD: 0.5 % (ref 0–1.9)
BILIRUB SERPL-MCNC: 0.3 MG/DL (ref 0.1–1)
BUN SERPL-MCNC: 9 MG/DL (ref 6–20)
CALCIUM SERPL-MCNC: 8.9 MG/DL (ref 8.7–10.5)
CHLORIDE SERPL-SCNC: 106 MMOL/L (ref 95–110)
CHOLEST SERPL-MCNC: 186 MG/DL (ref 120–199)
CHOLEST/HDLC SERPL: 4.5 {RATIO} (ref 2–5)
CO2 SERPL-SCNC: 25 MMOL/L (ref 23–29)
CREAT SERPL-MCNC: 0.7 MG/DL (ref 0.5–1.4)
DIFFERENTIAL METHOD: NORMAL
EOSINOPHIL # BLD AUTO: 0.1 K/UL (ref 0–0.5)
EOSINOPHIL NFR BLD: 1.5 % (ref 0–8)
ERYTHROCYTE [DISTWIDTH] IN BLOOD BY AUTOMATED COUNT: 12.9 % (ref 11.5–14.5)
EST. GFR  (AFRICAN AMERICAN): >60 ML/MIN/1.73 M^2
EST. GFR  (NON AFRICAN AMERICAN): >60 ML/MIN/1.73 M^2
ESTRADIOL SERPL-MCNC: 56 PG/ML
GLUCOSE SERPL-MCNC: 89 MG/DL (ref 70–110)
HCT VFR BLD AUTO: 39.2 % (ref 37–48.5)
HDLC SERPL-MCNC: 41 MG/DL (ref 40–75)
HDLC SERPL: 22 % (ref 20–50)
HGB BLD-MCNC: 12.7 G/DL (ref 12–16)
IMM GRANULOCYTES # BLD AUTO: 0.01 K/UL (ref 0–0.04)
IMM GRANULOCYTES NFR BLD AUTO: 0.2 % (ref 0–0.5)
LDLC SERPL CALC-MCNC: 125 MG/DL (ref 63–159)
LYMPHOCYTES # BLD AUTO: 1.9 K/UL (ref 1–4.8)
LYMPHOCYTES NFR BLD: 46.3 % (ref 18–48)
MCH RBC QN AUTO: 27.9 PG (ref 27–31)
MCHC RBC AUTO-ENTMCNC: 32.4 G/DL (ref 32–36)
MCV RBC AUTO: 86 FL (ref 82–98)
MONOCYTES # BLD AUTO: 0.3 K/UL (ref 0.3–1)
MONOCYTES NFR BLD: 6.2 % (ref 4–15)
NEUTROPHILS # BLD AUTO: 1.8 K/UL (ref 1.8–7.7)
NEUTROPHILS NFR BLD: 45.3 % (ref 38–73)
NONHDLC SERPL-MCNC: 145 MG/DL
NRBC BLD-RTO: 0 /100 WBC
PLATELET # BLD AUTO: 293 K/UL (ref 150–450)
PMV BLD AUTO: 10.6 FL (ref 9.2–12.9)
POTASSIUM SERPL-SCNC: 4.6 MMOL/L (ref 3.5–5.1)
PROT SERPL-MCNC: 6.9 G/DL (ref 6–8.4)
RBC # BLD AUTO: 4.56 M/UL (ref 4–5.4)
SODIUM SERPL-SCNC: 142 MMOL/L (ref 136–145)
TRIGL SERPL-MCNC: 100 MG/DL (ref 30–150)
WBC # BLD AUTO: 4.06 K/UL (ref 3.9–12.7)

## 2022-07-11 PROCEDURE — 82670 ASSAY OF TOTAL ESTRADIOL: CPT | Performed by: OBSTETRICS & GYNECOLOGY

## 2022-07-11 PROCEDURE — 36415 COLL VENOUS BLD VENIPUNCTURE: CPT | Performed by: OBSTETRICS & GYNECOLOGY

## 2022-07-11 PROCEDURE — 85025 COMPLETE CBC W/AUTO DIFF WBC: CPT | Performed by: OBSTETRICS & GYNECOLOGY

## 2022-07-11 PROCEDURE — 84402 ASSAY OF FREE TESTOSTERONE: CPT | Performed by: OBSTETRICS & GYNECOLOGY

## 2022-07-11 PROCEDURE — 80053 COMPREHEN METABOLIC PANEL: CPT | Performed by: OBSTETRICS & GYNECOLOGY

## 2022-07-11 PROCEDURE — 80061 LIPID PANEL: CPT | Performed by: OBSTETRICS & GYNECOLOGY

## 2022-07-14 LAB — TESTOST FREE SERPL-MCNC: 0.5 PG/ML

## 2022-07-18 ENCOUNTER — PATIENT MESSAGE (OUTPATIENT)
Dept: OBSTETRICS AND GYNECOLOGY | Facility: CLINIC | Age: 47
End: 2022-07-18
Payer: COMMERCIAL

## 2022-08-15 ENCOUNTER — TELEPHONE (OUTPATIENT)
Dept: OBSTETRICS AND GYNECOLOGY | Facility: CLINIC | Age: 47
End: 2022-08-15
Payer: COMMERCIAL

## 2022-08-15 NOTE — TELEPHONE ENCOUNTER
Hey,    This patient is on the schedule for pellets tomorrow with Dr. Fowler. She wanted to make sure that her pellets are in the office and also with Nidia still being out that it needs to be set up.

## 2022-08-16 ENCOUNTER — PROCEDURE VISIT (OUTPATIENT)
Dept: OBSTETRICS AND GYNECOLOGY | Facility: CLINIC | Age: 47
End: 2022-08-16
Payer: COMMERCIAL

## 2022-08-16 DIAGNOSIS — N95.1 SYMPTOMATIC MENOPAUSAL OR FEMALE CLIMACTERIC STATES: Primary | ICD-10-CM

## 2022-08-16 PROCEDURE — 11980 IMPLANT HORMONE PELLET(S): CPT | Mod: CSM,S$GLB,, | Performed by: OBSTETRICS & GYNECOLOGY

## 2022-08-16 PROCEDURE — 11980 PR IMPLANT,HORMONE,SUBCUTANEOUS: ICD-10-PCS | Mod: CSM,S$GLB,, | Performed by: OBSTETRICS & GYNECOLOGY

## 2022-08-16 NOTE — PROCEDURES
Procedures     Pellet and Injection    Chief Complaint: Hormone Deficiency    History of Present Illness: 46 y.o. female patient presents with menopausal symptoms.    Interval Changes: adequate response to the pellets    Procedure: Subcutaneous hormone pellet implantation with local anesthesia of 10 cc Xylocaine 1%.    Site: Buttocks    Sutures Used: yes    Pellets Inserted:  Testosterone 100 mg x 2  Estradiol 50 mg x 2  In the right Buttocks    Intramuscular Injection: None    Follow up with me in 6 months for pellet insertion.    Shannon Fowler MD 08/16/2022 8:57 AM

## 2022-08-25 ENCOUNTER — OFFICE VISIT (OUTPATIENT)
Dept: URGENT CARE | Facility: CLINIC | Age: 47
End: 2022-08-25
Payer: COMMERCIAL

## 2022-08-25 VITALS
BODY MASS INDEX: 27.92 KG/M2 | DIASTOLIC BLOOD PRESSURE: 76 MMHG | HEART RATE: 61 BPM | OXYGEN SATURATION: 99 % | WEIGHT: 195 LBS | HEIGHT: 70 IN | SYSTOLIC BLOOD PRESSURE: 133 MMHG | TEMPERATURE: 99 F | RESPIRATION RATE: 18 BRPM

## 2022-08-25 DIAGNOSIS — R51.9 ACUTE NONINTRACTABLE HEADACHE, UNSPECIFIED HEADACHE TYPE: ICD-10-CM

## 2022-08-25 DIAGNOSIS — R06.02 SHORTNESS OF BREATH: ICD-10-CM

## 2022-08-25 DIAGNOSIS — R07.89 CHEST WALL TENDERNESS: ICD-10-CM

## 2022-08-25 DIAGNOSIS — S39.012A BACK STRAIN, INITIAL ENCOUNTER: ICD-10-CM

## 2022-08-25 DIAGNOSIS — M94.0 COSTOCHONDRITIS: ICD-10-CM

## 2022-08-25 DIAGNOSIS — R42 LIGHTHEADEDNESS: Primary | ICD-10-CM

## 2022-08-25 LAB
BILIRUB UR QL STRIP: NEGATIVE
GLUCOSE SERPL-MCNC: 83 MG/DL (ref 70–110)
GLUCOSE UR QL STRIP: NEGATIVE
HGB, POC: 12.5 G/DL (ref 12–15)
KETONES UR QL STRIP: NEGATIVE
LEUKOCYTE ESTERASE UR QL STRIP: NEGATIVE
PH, POC UA: 7 (ref 5–8)
POC BLOOD, URINE: NEGATIVE
POC NITRATES, URINE: NEGATIVE
PROT UR QL STRIP: NEGATIVE
SP GR UR STRIP: 1.01 (ref 1–1.03)
UROBILINOGEN UR STRIP-ACNC: NORMAL (ref 0.1–1.1)

## 2022-08-25 PROCEDURE — 81003 URINALYSIS AUTO W/O SCOPE: CPT | Mod: QW,S$GLB,, | Performed by: PHYSICIAN ASSISTANT

## 2022-08-25 PROCEDURE — 93010 ELECTROCARDIOGRAM REPORT: CPT | Mod: S$GLB,,, | Performed by: INTERNAL MEDICINE

## 2022-08-25 PROCEDURE — 85018 POCT HEMOGLOBIN: ICD-10-PCS | Mod: QW,S$GLB,, | Performed by: PHYSICIAN ASSISTANT

## 2022-08-25 PROCEDURE — 3078F PR MOST RECENT DIASTOLIC BLOOD PRESSURE < 80 MM HG: ICD-10-PCS | Mod: CPTII,S$GLB,, | Performed by: PHYSICIAN ASSISTANT

## 2022-08-25 PROCEDURE — 82962 GLUCOSE BLOOD TEST: CPT | Mod: S$GLB,,, | Performed by: PHYSICIAN ASSISTANT

## 2022-08-25 PROCEDURE — 3008F PR BODY MASS INDEX (BMI) DOCUMENTED: ICD-10-PCS | Mod: CPTII,S$GLB,, | Performed by: PHYSICIAN ASSISTANT

## 2022-08-25 PROCEDURE — 3008F BODY MASS INDEX DOCD: CPT | Mod: CPTII,S$GLB,, | Performed by: PHYSICIAN ASSISTANT

## 2022-08-25 PROCEDURE — 3075F SYST BP GE 130 - 139MM HG: CPT | Mod: CPTII,S$GLB,, | Performed by: PHYSICIAN ASSISTANT

## 2022-08-25 PROCEDURE — 99204 OFFICE O/P NEW MOD 45 MIN: CPT | Mod: S$GLB,,, | Performed by: PHYSICIAN ASSISTANT

## 2022-08-25 PROCEDURE — 93005 ELECTROCARDIOGRAM TRACING: CPT | Mod: S$GLB,,, | Performed by: PHYSICIAN ASSISTANT

## 2022-08-25 PROCEDURE — 85018 HEMOGLOBIN: CPT | Mod: QW,S$GLB,, | Performed by: PHYSICIAN ASSISTANT

## 2022-08-25 PROCEDURE — 71046 XR CHEST PA AND LATERAL: ICD-10-PCS | Mod: S$GLB,,, | Performed by: RADIOLOGY

## 2022-08-25 PROCEDURE — 1160F PR REVIEW ALL MEDS BY PRESCRIBER/CLIN PHARMACIST DOCUMENTED: ICD-10-PCS | Mod: CPTII,S$GLB,, | Performed by: PHYSICIAN ASSISTANT

## 2022-08-25 PROCEDURE — 99204 PR OFFICE/OUTPT VISIT, NEW, LEVL IV, 45-59 MIN: ICD-10-PCS | Mod: S$GLB,,, | Performed by: PHYSICIAN ASSISTANT

## 2022-08-25 PROCEDURE — 3075F PR MOST RECENT SYSTOLIC BLOOD PRESS GE 130-139MM HG: ICD-10-PCS | Mod: CPTII,S$GLB,, | Performed by: PHYSICIAN ASSISTANT

## 2022-08-25 PROCEDURE — 1159F PR MEDICATION LIST DOCUMENTED IN MEDICAL RECORD: ICD-10-PCS | Mod: CPTII,S$GLB,, | Performed by: PHYSICIAN ASSISTANT

## 2022-08-25 PROCEDURE — 71046 X-RAY EXAM CHEST 2 VIEWS: CPT | Mod: S$GLB,,, | Performed by: RADIOLOGY

## 2022-08-25 PROCEDURE — 93010 EKG 12-LEAD: ICD-10-PCS | Mod: S$GLB,,, | Performed by: INTERNAL MEDICINE

## 2022-08-25 PROCEDURE — 93005 EKG 12-LEAD: ICD-10-PCS | Mod: S$GLB,,, | Performed by: PHYSICIAN ASSISTANT

## 2022-08-25 PROCEDURE — 1159F MED LIST DOCD IN RCRD: CPT | Mod: CPTII,S$GLB,, | Performed by: PHYSICIAN ASSISTANT

## 2022-08-25 PROCEDURE — 1160F RVW MEDS BY RX/DR IN RCRD: CPT | Mod: CPTII,S$GLB,, | Performed by: PHYSICIAN ASSISTANT

## 2022-08-25 PROCEDURE — 81003 POCT URINALYSIS, DIPSTICK, MANUAL, W/O SCOPE: ICD-10-PCS | Mod: QW,S$GLB,, | Performed by: PHYSICIAN ASSISTANT

## 2022-08-25 PROCEDURE — 82962 POCT GLUCOSE, HAND-HELD DEVICE: ICD-10-PCS | Mod: S$GLB,,, | Performed by: PHYSICIAN ASSISTANT

## 2022-08-25 PROCEDURE — 3078F DIAST BP <80 MM HG: CPT | Mod: CPTII,S$GLB,, | Performed by: PHYSICIAN ASSISTANT

## 2022-08-25 NOTE — PROGRESS NOTES
"Subjective:       Patient ID: Bhavana Bright is a 46 y.o. female.    Vitals:  height is 5' 10" (1.778 m) and weight is 88.5 kg (195 lb). Her tympanic temperature is 98.7 °F (37.1 °C). Her blood pressure is 133/76 and her pulse is 61. Her respiration is 18 and oxygen saturation is 99%.     Chief Complaint: Headache    Thjis is a healthy 46 y.o female presents today c/o headaches, dizziness, chest soreness, and mid back/L trapezius/left shoulder pain x2 days. Pt states that she initialy thought upper back.shoulder pain was from lifting weights, because it was bothering her few days ago, then pain worsened over the past 2 days.  Today she noticed some soreness/tenderness to left anterior chest, but not severely painful.  Today she also has experienced headaches and lightheadedness, does not seem to worsen with position. No pleuritic pain.  Pt notes that for the past several weeks she has had some intermittent shortness of breath- described as labored breathing and sensation of being aware of breathing, sometimes having to take deep breath to feel like she is getting enough oxygen. 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  No cough or fever. No leg swelling/calf pain. No recent surgery, immobilization, hospitalization, cancer/chemo. She has been getting regular subcu hormonal pellet treatment for a few years due to early menopause. No known hx of heart or lung disease. Non smoker. No cocaine use. Eating and drinking well. No urinary symptoms. No recent bleeding. No head trauma. No focal weakness, confusion, or speech difficulty. No abdominal pain or trauma.  Pt denies other known medical conditions. Pt states that due to her symptoms she wanted to make sure symptoms were not cardiac. Of note, pt states that back is not currently hurting.     Headache   This is a new problem. The current episode started in the past 7 days. The " pain is located in the frontal region. The pain does not radiate. The quality of the pain is described as aching. The pain is at a severity of 3/10. The pain is mild. Associated symptoms include back pain. Pertinent negatives include no abdominal pain, abnormal behavior, anorexia, blurred vision, coughing, dizziness, drainage, ear pain, eye pain, eye redness, eye watering, facial sweating, fever, hearing loss, insomnia, loss of balance, muscle aches, nausea, neck pain, numbness, phonophobia, photophobia, rhinorrhea, scalp tenderness, seizures, sinus pressure, sore throat, swollen glands, tingling, tinnitus, visual change, vomiting, weakness or weight loss. Nothing aggravates the symptoms. She has tried nothing for the symptoms. There is no history of migraine headaches.   Back Pain  Associated symptoms include headaches. Pertinent negatives include no abdominal pain, chest pain (not pain, per pt; tenderness/soreness to chest wall ), dysuria, fever, numbness, tingling, weakness or weight loss.   Dizziness:   Chronicity:  New  Progression since onset:  Unchanged  Frequency:  Constantly  Severity:  Mild   Associated symptoms: headaches and light-headedness.no hearing loss, no ear pain, no fever, no tinnitus, no nausea, no vomiting, no diaphoresis, no weakness, no syncope, no palpitations and no chest pain (not pain, per pt; tenderness/soreness to chest wall ).      Constitution: Negative for chills, sweating, fatigue and fever.   HENT: Negative for ear pain, tinnitus, hearing loss, sinus pressure and sore throat.    Neck: Negative for neck pain and neck stiffness.   Cardiovascular: Positive for sob on exertion (intermittent for weeks). Negative for chest trauma, chest pain (not pain, per pt; tenderness/soreness to chest wall ), leg swelling, palpitations and passing out.   Eyes: Negative for eye itching, eye pain, eye redness, photophobia and blurred vision.   Respiratory: Positive for shortness of breath (intermittent  for weeks). Negative for chest tightness, cough, sputum production, stridor and wheezing.    Gastrointestinal: Negative for abdominal pain, nausea and vomiting.   Genitourinary: Negative for dysuria, frequency, urgency and flank pain.   Musculoskeletal: Positive for pain and back pain.   Skin: Negative for color change and rash.   Neurological: Positive for light-headedness and headaches. Negative for dizziness, passing out, facial drooping, speech difficulty, coordination disturbances, loss of balance, history of migraines, disorientation, altered mental status, loss of consciousness, numbness, tingling, seizures and tremors.   Psychiatric/Behavioral: Negative for altered mental status and disorientation. The patient does not have insomnia.        Objective:      Physical Exam   Constitutional: She is oriented to person, place, and time. She appears well-developed. She is cooperative. No distress.   HENT:   Head: Normocephalic and atraumatic.   Nose: Nose normal.   Eyes: Conjunctivae and lids are normal.   Neck: Trachea normal and phonation normal. Neck supple. No neck rigidity present. No decreased range of motion present. No pain with movement present. No spinous process tenderness present. No muscular tenderness present.   Cardiovascular: Normal rate, regular rhythm, normal heart sounds and normal pulses.   Pulses:       Radial pulses are 2+ on the right side and 2+ on the left side.      Comments: No edema, swelling, or pain/ttp of LE   Pulmonary/Chest: Effort normal and breath sounds normal. No accessory muscle usage or stridor. No apnea, no tachypnea and no bradypnea. No respiratory distress. She has no decreased breath sounds. She has no wheezes. She has no rhonchi. She has no rales. She exhibits tenderness.   Lungs ctab. No resp distress.              Comments: Lungs ctab. No resp distress.     Abdominal: Normal appearance and bowel sounds are normal. She exhibits no abdominal bruit, no pulsatile midline  mass and no mass. Soft. There is no abdominal tenderness. There is no guarding.      Comments: Soft, no tenderness to palpation   Musculoskeletal:         General: No deformity.        Back:       Right lower leg: No edema.      Left lower leg: No edema.      Comments: bilat shoulder, elbows full ROM 5/5 strength. NV intact of UE   Neurological: She is alert and oriented to person, place, and time. She has normal motor skills, normal sensation, normal strength and normal reflexes. She displays no weakness, no atrophy, no tremor, facial symmetry and no dysarthria. No cranial nerve deficit or sensory deficit. She exhibits normal muscle tone. She has a normal Finger-Nose-Finger Test. Coordination: Romberg sign negative and Rapid alternating movements normal. She displays no seizure activity. Gait and coordination normal. Gait normal. GCS eye subscore is 4. GCS verbal subscore is 5. GCS motor subscore is 6.      Comments: Alert, oriented x 3. EOMI, PERRLA. Cranial nerves intact: facial expressions symmetric. Shoulder shrug str.5/5 bilaterally. Jaw is midline without deviation. Tongue protrudes at midline without fasciculations.  Sensation to upper and lower extremities intact. Patient ambulates unassisted without rigidity or ataxia. Romberg negative. Voice quality, comprehension, articulation, coherence assessed as appropriate.   Bilateral shoulders, elbows knees exhibit full range of motion and 5/5 strength.   strength 5/5 bilaterally.     Skin: Skin is warm, dry, intact and not diaphoretic.   Psychiatric: Her speech is normal and behavior is normal. Judgment and thought content normal.   Nursing note and vitals reviewed.          Results for orders placed or performed in visit on 08/25/22   POCT HEMOGLOBIN   Result Value Ref Range    Hemoglobin 12.5 12.0 - 15.0 g/dL   POCT Glucose, Hand-Held Device   Result Value Ref Range    POC Glucose 83 70 - 110 MG/DL   POCT Urinalysis, Dipstick, Manual, w/o Scope   Result  Value Ref Range    POC Blood, Urine Negative Negative    POC Bilirubin, Urine Negative Negative    POC Urobilinogen, Urine Normal 0.1 - 1.1    POC Ketones, Urine Negative Negative    POC Protein, Urine Negative Negative    POC Nitrates, Urine Negative Negative    POC Glucose, Urine Negative Negative    pH, UA 7.0 5 - 8    POC Specific Gravity, Urine 1.015 1.003 - 1.029    POC Leukocytes, Urine Negative Negative     The EKG shows a normal, regular Sinus Rhythm, at a rate of 61, there are not ST Changes. There is not a previous EKG for comparison.     Labs from 7/11/22 reviewed, are normal.   Assessment:       1. Lightheadedness    2. Shortness of breath    3. Chest wall tenderness    4. Back strain, initial encounter    5. Acute nonintractable headache, unspecified headache type    6. Costochondritis          Plan:       Pt with multiple symptoms today- headaches and lightheadedness- etiology unclear at this time. Workup today negative, vitals reassuring, exam is benign. Neuro exam normal.  Instructed increase fluids, rest, monitor closely.    Regarding chest wall tenderness and back pain, this seems muscular in nature. CXR and EKG normal, wells score for pe- low risk. Unlikely cardiovascular/pulmonary etiology. Regarding the dyspnea- CXR negative, lungs clear to auscultation, and O2 normal- instructed follow up with PCP I do not feel that emergent workup is needed in ER at this time, but have instructed close follow up with PCP and given ER precautions.  Lastly instructed to monitor for rash, if shingles rash develops in area of pain, seek medical attention immediately. Pt will take otc nsaid, has rx for tizanidine for muscle relaxer.   Lightheadedness  -     IN OFFICE EKG 12-LEAD (to Muse)  -     POCT HEMOGLOBIN  -     POCT Glucose, Hand-Held Device  -     POCT Urinalysis, Dipstick, Manual, w/o Scope    Shortness of breath  -     XR CHEST PA AND LATERAL; Future; Expected date: 08/25/2022    Chest wall  tenderness    Back strain, initial encounter    Acute nonintractable headache, unspecified headache type    Costochondritis      Patient Instructions   - Rest.    - Drink plenty of fluids.    - Acetaminophen (tylenol) or Ibuprofen (advil,motrin) as directed as needed for fever/pain. Avoid tylenol if you have a history of liver disease. Do not take ibuprofen if you have a history of GI bleeding, kidney disease, or if you take blood thinners.     - Follow up with your PCP or specialty clinic as directed in the next 1-2 weeks if not improved or as needed.  You can call [phone] to schedule an appointment with the appropriate provider.    - Go to the ER or seek medical attention immediately if you develop new or worsening symptoms.     - You must understand that you have received an Urgent Care treatment only and that you may be released before all of your medical problems are known or treated.   - You, the patient, will arrange for follow up care as instructed.   - If your condition worsens or fails to improve we recommend that you receive another evaluation at the ER immediately or contact your PCP to discuss your concerns or return here.

## 2022-08-26 NOTE — PATIENT INSTRUCTIONS
- Rest.    - Drink plenty of fluids.    - Acetaminophen (tylenol) or Ibuprofen (advil,motrin) as directed as needed for fever/pain. Avoid tylenol if you have a history of liver disease. Do not take ibuprofen if you have a history of GI bleeding, kidney disease, or if you take blood thinners.     - Follow up with your PCP or specialty clinic as directed in the next 1-2 weeks if not improved or as needed.  You can call [phone] to schedule an appointment with the appropriate provider.    - Go to the ER or seek medical attention immediately if you develop new or worsening symptoms.     - You must understand that you have received an Urgent Care treatment only and that you may be released before all of your medical problems are known or treated.   - You, the patient, will arrange for follow up care as instructed.   - If your condition worsens or fails to improve we recommend that you receive another evaluation at the ER immediately or contact your PCP to discuss your concerns or return here.

## 2022-09-22 ENCOUNTER — OFFICE VISIT (OUTPATIENT)
Dept: PRIMARY CARE CLINIC | Facility: CLINIC | Age: 47
End: 2022-09-22
Payer: COMMERCIAL

## 2022-09-22 DIAGNOSIS — H81.13 BENIGN POSITIONAL VERTIGO, BILATERAL: Primary | ICD-10-CM

## 2022-09-22 PROCEDURE — 99203 PR OFFICE/OUTPT VISIT, NEW, LEVL III, 30-44 MIN: ICD-10-PCS | Mod: 95,,, | Performed by: FAMILY MEDICINE

## 2022-09-22 PROCEDURE — 1160F PR REVIEW ALL MEDS BY PRESCRIBER/CLIN PHARMACIST DOCUMENTED: ICD-10-PCS | Mod: CPTII,95,, | Performed by: FAMILY MEDICINE

## 2022-09-22 PROCEDURE — 1159F PR MEDICATION LIST DOCUMENTED IN MEDICAL RECORD: ICD-10-PCS | Mod: CPTII,95,, | Performed by: FAMILY MEDICINE

## 2022-09-22 PROCEDURE — 1159F MED LIST DOCD IN RCRD: CPT | Mod: CPTII,95,, | Performed by: FAMILY MEDICINE

## 2022-09-22 PROCEDURE — 1160F RVW MEDS BY RX/DR IN RCRD: CPT | Mod: CPTII,95,, | Performed by: FAMILY MEDICINE

## 2022-09-22 PROCEDURE — 99203 OFFICE O/P NEW LOW 30 MIN: CPT | Mod: 95,,, | Performed by: FAMILY MEDICINE

## 2022-09-22 NOTE — PROGRESS NOTES
The patient location is: [place]  The chief complaint leading to consultation is: dizziness and vomiting    Visit type: audiovisual    Face to Face time with patient:   21 19 minutes of total time spent on the encounter, which includes face to face time and non-face to face time preparing to see the patient (eg, review of tests), Obtaining and/or reviewing separately obtained history, Documenting clinical information in the electronic or other health record, Independently interpreting results (not separately reported) and communicating results to the patient/family/caregiver, or Care coordination (not separately reported).         Each patient to whom he or she provides medical services by telemedicine is:  (1) informed of the relationship between the physician and patient and the respective role of any other health care provider with respect to management of the patient; and (2) notified that he or she may decline to receive medical services by telemedicine and may withdraw from such care at any time.    Notes:    History of present illness:  Patient is a 38-year-old female who presents with acute onset of vertigo, nausea and vomiting associated with changes in position for the past 3 days.  Patient first noticed symptoms when she was exercising and bent down to stretch.  Since that time she has experienced a spinning sensation of her environment whenever she lies down in a certain position, L>R side.  She also reports abnormal eye movements.  She reports some relief of her vomiting with Zofran.  Patient reports slight nasal congestion and mild headache for 2 days.  She took a COVID test which was negative.  Patient reports marked improvement of her symptoms today with only mild nausea    Review system:  Denies fever.  No tinnitus or ear pain.  No numbness or weakness involving extremities.  No chest pain.    Past Medical History:   Diagnosis Date    Abnormal Pap smear     Abnormal Pap smear of cervix     Pregnancy  resulting from in vitro fertilization     Premature ovarian failure       Physical exam:  General: Alert, no acute distress   HEENT:  NC/AT, no facial swelling of deformity, no nystagmus  Neck:  supple  Resp:  Normal effort, no respiratory distress   Psych:  Normal affect     Diagnoses and all orders for this visit:    Benign positional vertigo, bilateral  Patient advised to continue Zofran as needed for nausea.  May also consider dramamine me in the future.  Patient advised to follow-up if she develops any other neurological symptoms such as numbness, weakness, visual changes or severe disequilibrium.

## 2022-10-21 ENCOUNTER — PATIENT MESSAGE (OUTPATIENT)
Dept: OBSTETRICS AND GYNECOLOGY | Facility: CLINIC | Age: 47
End: 2022-10-21
Payer: COMMERCIAL

## 2022-10-21 DIAGNOSIS — Z12.31 VISIT FOR SCREENING MAMMOGRAM: Primary | ICD-10-CM

## 2022-12-19 ENCOUNTER — HOSPITAL ENCOUNTER (OUTPATIENT)
Dept: RADIOLOGY | Facility: HOSPITAL | Age: 47
Discharge: HOME | End: 2022-12-19
Attending: OBSTETRICS & GYNECOLOGY
Payer: COMMERCIAL

## 2022-12-19 DIAGNOSIS — Z12.31 VISIT FOR SCREENING MAMMOGRAM: ICD-10-CM

## 2022-12-19 PROCEDURE — 77067 SCR MAMMO BI INCL CAD: CPT | Mod: 26,,, | Performed by: RADIOLOGY

## 2022-12-19 PROCEDURE — 77063 MAMMO DIGITAL SCREENING BILAT WITH TOMO: ICD-10-PCS | Mod: 26,,, | Performed by: RADIOLOGY

## 2022-12-19 PROCEDURE — 77063 BREAST TOMOSYNTHESIS BI: CPT | Mod: TC,PO

## 2022-12-19 PROCEDURE — 77067 SCR MAMMO BI INCL CAD: CPT | Mod: TC,PO

## 2022-12-19 PROCEDURE — 77063 BREAST TOMOSYNTHESIS BI: CPT | Mod: 26,,, | Performed by: RADIOLOGY

## 2022-12-19 PROCEDURE — 77067 MAMMO DIGITAL SCREENING BILAT WITH TOMO: ICD-10-PCS | Mod: 26,,, | Performed by: RADIOLOGY

## 2023-02-10 ENCOUNTER — PATIENT MESSAGE (OUTPATIENT)
Dept: OBSTETRICS AND GYNECOLOGY | Facility: CLINIC | Age: 48
End: 2023-02-10
Payer: COMMERCIAL

## 2023-02-20 ENCOUNTER — PATIENT MESSAGE (OUTPATIENT)
Dept: OBSTETRICS AND GYNECOLOGY | Facility: CLINIC | Age: 48
End: 2023-02-20
Payer: COMMERCIAL

## 2023-02-27 ENCOUNTER — TELEPHONE (OUTPATIENT)
Dept: OBSTETRICS AND GYNECOLOGY | Facility: CLINIC | Age: 48
End: 2023-02-27
Payer: COMMERCIAL

## 2023-02-27 DIAGNOSIS — E28.39 PREMATURE OVARIAN FAILURE: ICD-10-CM

## 2023-02-27 DIAGNOSIS — N95.1 SYMPTOMATIC MENOPAUSAL OR FEMALE CLIMACTERIC STATES: Primary | ICD-10-CM

## 2023-02-28 ENCOUNTER — LAB VISIT (OUTPATIENT)
Dept: LAB | Facility: OTHER | Age: 48
End: 2023-02-28
Attending: OBSTETRICS & GYNECOLOGY
Payer: COMMERCIAL

## 2023-02-28 DIAGNOSIS — N95.1 SYMPTOMATIC MENOPAUSAL OR FEMALE CLIMACTERIC STATES: ICD-10-CM

## 2023-02-28 DIAGNOSIS — E28.39 PREMATURE OVARIAN FAILURE: ICD-10-CM

## 2023-02-28 LAB — ESTRADIOL SERPL-MCNC: 125 PG/ML

## 2023-02-28 PROCEDURE — 36415 COLL VENOUS BLD VENIPUNCTURE: CPT | Performed by: OBSTETRICS & GYNECOLOGY

## 2023-02-28 PROCEDURE — 82670 ASSAY OF TOTAL ESTRADIOL: CPT | Performed by: OBSTETRICS & GYNECOLOGY

## 2023-02-28 PROCEDURE — 84402 ASSAY OF FREE TESTOSTERONE: CPT | Performed by: OBSTETRICS & GYNECOLOGY

## 2023-03-06 ENCOUNTER — PATIENT MESSAGE (OUTPATIENT)
Dept: OBSTETRICS AND GYNECOLOGY | Facility: CLINIC | Age: 48
End: 2023-03-06
Payer: COMMERCIAL

## 2023-03-06 LAB — TESTOST FREE SERPL-MCNC: <0.4 PG/ML

## 2023-03-10 DIAGNOSIS — M47.819 ARTHROPATHY OF FACET JOINTS AT MULTIPLE LEVELS: Primary | ICD-10-CM

## 2023-03-10 RX ORDER — MELOXICAM 15 MG/1
15 TABLET ORAL DAILY
Qty: 30 TABLET | Refills: 2 | Status: SHIPPED | OUTPATIENT
Start: 2023-03-10 | End: 2023-05-24

## 2023-03-23 ENCOUNTER — CLINICAL SUPPORT (OUTPATIENT)
Dept: OBSTETRICS AND GYNECOLOGY | Facility: CLINIC | Age: 48
End: 2023-03-23
Payer: COMMERCIAL

## 2023-03-23 DIAGNOSIS — N95.1 SYMPTOMATIC MENOPAUSAL OR FEMALE CLIMACTERIC STATES: Primary | ICD-10-CM

## 2023-03-23 PROCEDURE — 96372 THER/PROPH/DIAG INJ SC/IM: CPT | Mod: S$GLB,,, | Performed by: OBSTETRICS & GYNECOLOGY

## 2023-03-23 PROCEDURE — 96372 PR INJECTION,THERAP/PROPH/DIAG2ST, IM OR SUBCUT: ICD-10-PCS | Mod: S$GLB,,, | Performed by: OBSTETRICS & GYNECOLOGY

## 2023-03-23 PROCEDURE — 99999 PR PBB SHADOW E&M-EST. PATIENT-LVL I: CPT | Mod: PBBFAC,,,

## 2023-03-23 PROCEDURE — 99999 PR PBB SHADOW E&M-EST. PATIENT-LVL I: ICD-10-PCS | Mod: PBBFAC,,,

## 2023-03-23 RX ORDER — ESTRADIOL VALERATE 40 MG/ML
40 INJECTION INTRAMUSCULAR
Status: COMPLETED | OUTPATIENT
Start: 2023-03-23 | End: 2023-03-23

## 2023-03-23 RX ORDER — TESTOSTERONE CYPIONATE 200 MG/ML
100 INJECTION, SOLUTION INTRAMUSCULAR
Status: COMPLETED | OUTPATIENT
Start: 2023-03-23 | End: 2023-03-23

## 2023-03-23 RX ADMIN — TESTOSTERONE CYPIONATE 100 MG: 200 INJECTION, SOLUTION INTRAMUSCULAR at 10:03

## 2023-03-23 RX ADMIN — ESTRADIOL VALERATE 40 MG: 40 INJECTION INTRAMUSCULAR at 10:03

## 2023-03-23 NOTE — PROGRESS NOTES
Here for hormone therapy injection, no complaints at this time. Injection given as ordered, tolerated well, no report of pain prior to or after injection. Return to clinic as scheduled    Site: RB    Testosterone    100  mg     Delestrogen 40     mg     Cinic supplied medication

## 2023-04-20 ENCOUNTER — CLINICAL SUPPORT (OUTPATIENT)
Dept: OBSTETRICS AND GYNECOLOGY | Facility: CLINIC | Age: 48
End: 2023-04-20
Payer: COMMERCIAL

## 2023-04-20 DIAGNOSIS — N95.1 SYMPTOMATIC MENOPAUSAL OR FEMALE CLIMACTERIC STATES: Primary | ICD-10-CM

## 2023-04-20 PROCEDURE — 96372 PR INJECTION,THERAP/PROPH/DIAG2ST, IM OR SUBCUT: ICD-10-PCS | Mod: S$GLB,,, | Performed by: OBSTETRICS & GYNECOLOGY

## 2023-04-20 PROCEDURE — 96372 THER/PROPH/DIAG INJ SC/IM: CPT | Mod: S$GLB,,, | Performed by: OBSTETRICS & GYNECOLOGY

## 2023-04-20 PROCEDURE — 99999 PR PBB SHADOW E&M-EST. PATIENT-LVL I: CPT | Mod: PBBFAC,,,

## 2023-04-20 PROCEDURE — 99999 PR PBB SHADOW E&M-EST. PATIENT-LVL I: ICD-10-PCS | Mod: PBBFAC,,,

## 2023-04-20 RX ORDER — ESTRADIOL VALERATE 40 MG/ML
40 INJECTION INTRAMUSCULAR
Status: COMPLETED | OUTPATIENT
Start: 2023-04-20 | End: 2023-06-26

## 2023-04-20 RX ORDER — TESTOSTERONE CYPIONATE 200 MG/ML
100 INJECTION, SOLUTION INTRAMUSCULAR
Status: COMPLETED | OUTPATIENT
Start: 2023-04-20 | End: 2023-06-26

## 2023-04-20 RX ADMIN — TESTOSTERONE CYPIONATE 100 MG: 200 INJECTION, SOLUTION INTRAMUSCULAR at 08:04

## 2023-04-20 RX ADMIN — ESTRADIOL VALERATE 40 MG: 40 INJECTION INTRAMUSCULAR at 08:04

## 2023-04-20 NOTE — PROGRESS NOTES
Here for hormone therapy injection, no complaints at this time. Injection given as ordered, tolerated well no reports of pain prior to or post injection. Advised to return to clinic as scheduled      Site:LB    Testerone:100 mg    Delestrogen: 40 mg    CLINIC SUPPLIED MEDICATION

## 2023-05-15 ENCOUNTER — PATIENT MESSAGE (OUTPATIENT)
Dept: OBSTETRICS AND GYNECOLOGY | Facility: CLINIC | Age: 48
End: 2023-05-15
Payer: COMMERCIAL

## 2023-05-15 DIAGNOSIS — Z00.00 ANNUAL PHYSICAL EXAM: Primary | ICD-10-CM

## 2023-05-18 ENCOUNTER — CLINICAL SUPPORT (OUTPATIENT)
Dept: OBSTETRICS AND GYNECOLOGY | Facility: CLINIC | Age: 48
End: 2023-05-18
Payer: COMMERCIAL

## 2023-05-18 DIAGNOSIS — N95.1 SYMPTOMATIC MENOPAUSAL OR FEMALE CLIMACTERIC STATES: Primary | ICD-10-CM

## 2023-05-18 PROCEDURE — 96372 THER/PROPH/DIAG INJ SC/IM: CPT | Mod: S$GLB,,, | Performed by: OBSTETRICS & GYNECOLOGY

## 2023-05-18 PROCEDURE — 99999 PR PBB SHADOW E&M-EST. PATIENT-LVL I: ICD-10-PCS | Mod: PBBFAC,,,

## 2023-05-18 PROCEDURE — 99999 PR PBB SHADOW E&M-EST. PATIENT-LVL I: CPT | Mod: PBBFAC,,,

## 2023-05-18 PROCEDURE — 96372 PR INJECTION,THERAP/PROPH/DIAG2ST, IM OR SUBCUT: ICD-10-PCS | Mod: S$GLB,,, | Performed by: OBSTETRICS & GYNECOLOGY

## 2023-05-18 RX ADMIN — ESTRADIOL VALERATE 40 MG: 40 INJECTION INTRAMUSCULAR at 08:05

## 2023-05-18 RX ADMIN — TESTOSTERONE CYPIONATE 100 MG: 200 INJECTION, SOLUTION INTRAMUSCULAR at 08:05

## 2023-05-18 NOTE — PROGRESS NOTES
Here for hormone therapy injection, no complaints at this time. Injection given as ordered, tolerated well no reports of pain prior to or post injections. Advised to return to clinic as scheduled .    Site: RB    Testosterone 100mg  Delestrogen 40mg    CLINIC SUPPLIED MEDICATION

## 2023-05-24 DIAGNOSIS — M47.819 ARTHROPATHY OF FACET JOINTS AT MULTIPLE LEVELS: Primary | ICD-10-CM

## 2023-05-24 RX ORDER — ETODOLAC 300 MG/1
300 CAPSULE ORAL 2 TIMES DAILY
Qty: 180 CAPSULE | Refills: 0 | Status: SHIPPED | OUTPATIENT
Start: 2023-05-24 | End: 2023-08-22

## 2023-06-26 ENCOUNTER — CLINICAL SUPPORT (OUTPATIENT)
Dept: OBSTETRICS AND GYNECOLOGY | Facility: CLINIC | Age: 48
End: 2023-06-26
Payer: COMMERCIAL

## 2023-06-26 DIAGNOSIS — N95.1 SYMPTOMATIC MENOPAUSAL OR FEMALE CLIMACTERIC STATES: Primary | ICD-10-CM

## 2023-06-26 PROCEDURE — 96372 PR INJECTION,THERAP/PROPH/DIAG2ST, IM OR SUBCUT: ICD-10-PCS | Mod: S$GLB,,, | Performed by: OBSTETRICS & GYNECOLOGY

## 2023-06-26 PROCEDURE — 99999 PR PBB SHADOW E&M-EST. PATIENT-LVL I: ICD-10-PCS | Mod: PBBFAC,,,

## 2023-06-26 PROCEDURE — 99999 PR PBB SHADOW E&M-EST. PATIENT-LVL I: CPT | Mod: PBBFAC,,,

## 2023-06-26 PROCEDURE — 96372 THER/PROPH/DIAG INJ SC/IM: CPT | Mod: S$GLB,,, | Performed by: OBSTETRICS & GYNECOLOGY

## 2023-06-26 RX ADMIN — TESTOSTERONE CYPIONATE 100 MG: 200 INJECTION, SOLUTION INTRAMUSCULAR at 10:06

## 2023-06-26 RX ADMIN — ESTRADIOL VALERATE 40 MG: 40 INJECTION INTRAMUSCULAR at 10:06

## 2023-06-26 NOTE — PROGRESS NOTES
Here for hormone therapy injection, no complaints at this time, Injection given as ordered, tolerated well, no report of pain prior to or after injection. Return to clinic as scheduled.     Site - LB    Testosterone  100  mg  Delestrogen 40 mg    Clinic Supplied Medication

## 2023-07-25 ENCOUNTER — CLINICAL SUPPORT (OUTPATIENT)
Dept: OBSTETRICS AND GYNECOLOGY | Facility: CLINIC | Age: 48
End: 2023-07-25
Payer: COMMERCIAL

## 2023-07-25 DIAGNOSIS — N95.1 SYMPTOMATIC MENOPAUSAL OR FEMALE CLIMACTERIC STATES: Primary | ICD-10-CM

## 2023-07-25 PROCEDURE — 96372 THER/PROPH/DIAG INJ SC/IM: CPT | Mod: S$GLB,,, | Performed by: OBSTETRICS & GYNECOLOGY

## 2023-07-25 PROCEDURE — 96372 PR INJECTION,THERAP/PROPH/DIAG2ST, IM OR SUBCUT: ICD-10-PCS | Mod: S$GLB,,, | Performed by: OBSTETRICS & GYNECOLOGY

## 2023-07-25 PROCEDURE — 99999 PR PBB SHADOW E&M-EST. PATIENT-LVL I: CPT | Mod: PBBFAC,,,

## 2023-07-25 PROCEDURE — 99999 PR PBB SHADOW E&M-EST. PATIENT-LVL I: ICD-10-PCS | Mod: PBBFAC,,,

## 2023-07-25 RX ORDER — TESTOSTERONE CYPIONATE 200 MG/ML
100 INJECTION, SOLUTION INTRAMUSCULAR
Status: COMPLETED | OUTPATIENT
Start: 2023-07-25 | End: 2023-10-19

## 2023-07-25 RX ORDER — ESTRADIOL VALERATE 40 MG/ML
40 INJECTION INTRAMUSCULAR
Status: COMPLETED | OUTPATIENT
Start: 2023-07-25 | End: 2023-10-19

## 2023-07-25 RX ADMIN — TESTOSTERONE CYPIONATE 100 MG: 200 INJECTION, SOLUTION INTRAMUSCULAR at 08:07

## 2023-07-25 RX ADMIN — ESTRADIOL VALERATE 40 MG: 40 INJECTION INTRAMUSCULAR at 08:07

## 2023-07-25 NOTE — PROGRESS NOTES
Here for hormone therapy injection, no complaints at this time. Injection given as ordered, tolerated well no reports of pain prior to or post injection. Advised to return to clinic as scheduled      Site:rb    Testerone:100 mg    Delestrogen: 40mg    CLINIC SUPPLIED MEDICATION         
8

## 2023-08-16 ENCOUNTER — PATIENT MESSAGE (OUTPATIENT)
Dept: OBSTETRICS AND GYNECOLOGY | Facility: CLINIC | Age: 48
End: 2023-08-16
Payer: COMMERCIAL

## 2023-08-24 ENCOUNTER — PATIENT MESSAGE (OUTPATIENT)
Dept: OBSTETRICS AND GYNECOLOGY | Facility: CLINIC | Age: 48
End: 2023-08-24
Payer: COMMERCIAL

## 2023-08-24 ENCOUNTER — LAB VISIT (OUTPATIENT)
Dept: LAB | Facility: OTHER | Age: 48
End: 2023-08-24
Attending: OBSTETRICS & GYNECOLOGY
Payer: COMMERCIAL

## 2023-08-24 ENCOUNTER — CLINICAL SUPPORT (OUTPATIENT)
Dept: OBSTETRICS AND GYNECOLOGY | Facility: CLINIC | Age: 48
End: 2023-08-24
Payer: COMMERCIAL

## 2023-08-24 DIAGNOSIS — N95.1 SYMPTOMATIC MENOPAUSAL OR FEMALE CLIMACTERIC STATES: Primary | ICD-10-CM

## 2023-08-24 DIAGNOSIS — Z00.00 ANNUAL PHYSICAL EXAM: ICD-10-CM

## 2023-08-24 LAB
ALBUMIN SERPL BCP-MCNC: 3.9 G/DL (ref 3.5–5.2)
ALP SERPL-CCNC: 59 U/L (ref 55–135)
ALT SERPL W/O P-5'-P-CCNC: 18 U/L (ref 10–44)
ANION GAP SERPL CALC-SCNC: 6 MMOL/L (ref 8–16)
AST SERPL-CCNC: 17 U/L (ref 10–40)
BASOPHILS # BLD AUTO: 0.02 K/UL (ref 0–0.2)
BASOPHILS NFR BLD: 0.5 % (ref 0–1.9)
BILIRUB SERPL-MCNC: 0.3 MG/DL (ref 0.1–1)
BUN SERPL-MCNC: 7 MG/DL (ref 6–20)
CALCIUM SERPL-MCNC: 9 MG/DL (ref 8.7–10.5)
CHLORIDE SERPL-SCNC: 107 MMOL/L (ref 95–110)
CHOLEST SERPL-MCNC: 177 MG/DL (ref 120–199)
CHOLEST/HDLC SERPL: 4.4 {RATIO} (ref 2–5)
CO2 SERPL-SCNC: 26 MMOL/L (ref 23–29)
CREAT SERPL-MCNC: 0.8 MG/DL (ref 0.5–1.4)
DIFFERENTIAL METHOD: ABNORMAL
EOSINOPHIL # BLD AUTO: 0.1 K/UL (ref 0–0.5)
EOSINOPHIL NFR BLD: 3 % (ref 0–8)
ERYTHROCYTE [DISTWIDTH] IN BLOOD BY AUTOMATED COUNT: 13.2 % (ref 11.5–14.5)
EST. GFR  (NO RACE VARIABLE): >60 ML/MIN/1.73 M^2
GLUCOSE SERPL-MCNC: 90 MG/DL (ref 70–110)
HCT VFR BLD AUTO: 39.2 % (ref 37–48.5)
HDLC SERPL-MCNC: 40 MG/DL (ref 40–75)
HDLC SERPL: 22.6 % (ref 20–50)
HGB BLD-MCNC: 12.4 G/DL (ref 12–16)
IMM GRANULOCYTES # BLD AUTO: 0.01 K/UL (ref 0–0.04)
IMM GRANULOCYTES NFR BLD AUTO: 0.3 % (ref 0–0.5)
LDLC SERPL CALC-MCNC: 111.4 MG/DL (ref 63–159)
LYMPHOCYTES # BLD AUTO: 1.6 K/UL (ref 1–4.8)
LYMPHOCYTES NFR BLD: 40.5 % (ref 18–48)
MCH RBC QN AUTO: 26.6 PG (ref 27–31)
MCHC RBC AUTO-ENTMCNC: 31.6 G/DL (ref 32–36)
MCV RBC AUTO: 84 FL (ref 82–98)
MONOCYTES # BLD AUTO: 0.3 K/UL (ref 0.3–1)
MONOCYTES NFR BLD: 8.4 % (ref 4–15)
NEUTROPHILS # BLD AUTO: 1.9 K/UL (ref 1.8–7.7)
NEUTROPHILS NFR BLD: 47.3 % (ref 38–73)
NONHDLC SERPL-MCNC: 137 MG/DL
NRBC BLD-RTO: 0 /100 WBC
PLATELET # BLD AUTO: 302 K/UL (ref 150–450)
PMV BLD AUTO: 9.9 FL (ref 9.2–12.9)
POTASSIUM SERPL-SCNC: 3.8 MMOL/L (ref 3.5–5.1)
PROT SERPL-MCNC: 7.1 G/DL (ref 6–8.4)
RBC # BLD AUTO: 4.67 M/UL (ref 4–5.4)
SODIUM SERPL-SCNC: 139 MMOL/L (ref 136–145)
TRIGL SERPL-MCNC: 128 MG/DL (ref 30–150)
WBC # BLD AUTO: 3.95 K/UL (ref 3.9–12.7)

## 2023-08-24 PROCEDURE — 36415 COLL VENOUS BLD VENIPUNCTURE: CPT | Performed by: OBSTETRICS & GYNECOLOGY

## 2023-08-24 PROCEDURE — 96372 THER/PROPH/DIAG INJ SC/IM: CPT | Mod: S$GLB,,, | Performed by: OBSTETRICS & GYNECOLOGY

## 2023-08-24 PROCEDURE — 80053 COMPREHEN METABOLIC PANEL: CPT | Performed by: OBSTETRICS & GYNECOLOGY

## 2023-08-24 PROCEDURE — 99999 PR PBB SHADOW E&M-EST. PATIENT-LVL I: CPT | Mod: PBBFAC,,,

## 2023-08-24 PROCEDURE — 85025 COMPLETE CBC W/AUTO DIFF WBC: CPT | Performed by: OBSTETRICS & GYNECOLOGY

## 2023-08-24 PROCEDURE — 96372 PR INJECTION,THERAP/PROPH/DIAG2ST, IM OR SUBCUT: ICD-10-PCS | Mod: S$GLB,,, | Performed by: OBSTETRICS & GYNECOLOGY

## 2023-08-24 PROCEDURE — 99999 PR PBB SHADOW E&M-EST. PATIENT-LVL I: ICD-10-PCS | Mod: PBBFAC,,,

## 2023-08-24 PROCEDURE — 80061 LIPID PANEL: CPT | Performed by: OBSTETRICS & GYNECOLOGY

## 2023-08-24 RX ADMIN — ESTRADIOL VALERATE 40 MG: 40 INJECTION INTRAMUSCULAR at 08:08

## 2023-08-24 RX ADMIN — TESTOSTERONE CYPIONATE 100 MG: 200 INJECTION, SOLUTION INTRAMUSCULAR at 08:08

## 2023-08-25 NOTE — NURSING
12/21/2019      Pt verbalized understanding D/C instructions and education provided pertinent to D/C needs. IV cath removed fully intact. No distress noted. Pt awaiting spouse to  for discharge.             Antonietta Cui RN    [Dear  ___] : Dear  [unfilled], [Consult Letter:] : I had the pleasure of evaluating your patient, [unfilled]. [Please see my note below.] : Please see my note below. [Consult Closing:] : Thank you very much for allowing me to participate in the care of this patient.  If you have any questions, please do not hesitate to contact me. [Sincerely,] : Sincerely, [FreeTextEntry3] : Dr. Kim

## 2023-08-29 RX ORDER — DEXAMETHASONE 4 MG/1
4 TABLET ORAL EVERY 12 HOURS
Qty: 20 TABLET | Refills: 0 | Status: SHIPPED | OUTPATIENT
Start: 2023-08-29 | End: 2023-09-08

## 2023-09-21 ENCOUNTER — CLINICAL SUPPORT (OUTPATIENT)
Dept: OBSTETRICS AND GYNECOLOGY | Facility: CLINIC | Age: 48
End: 2023-09-21
Payer: COMMERCIAL

## 2023-09-21 DIAGNOSIS — N95.1 SYMPTOMATIC MENOPAUSAL OR FEMALE CLIMACTERIC STATES: Primary | ICD-10-CM

## 2023-09-21 PROCEDURE — 96372 THER/PROPH/DIAG INJ SC/IM: CPT | Mod: S$GLB,,, | Performed by: OBSTETRICS & GYNECOLOGY

## 2023-09-21 PROCEDURE — 99999 PR PBB SHADOW E&M-EST. PATIENT-LVL I: CPT | Mod: PBBFAC,,,

## 2023-09-21 PROCEDURE — 96372 PR INJECTION,THERAP/PROPH/DIAG2ST, IM OR SUBCUT: ICD-10-PCS | Mod: S$GLB,,, | Performed by: OBSTETRICS & GYNECOLOGY

## 2023-09-21 PROCEDURE — 99999 PR PBB SHADOW E&M-EST. PATIENT-LVL I: ICD-10-PCS | Mod: PBBFAC,,,

## 2023-09-21 RX ADMIN — TESTOSTERONE CYPIONATE 100 MG: 200 INJECTION, SOLUTION INTRAMUSCULAR at 08:09

## 2023-09-21 RX ADMIN — ESTRADIOL VALERATE 40 MG: 40 INJECTION INTRAMUSCULAR at 08:09

## 2023-09-21 NOTE — PROGRESS NOTES
Here for hormone therapy injection, no complaints at this time. Injection given as ordered, tolerated well no reports of pain prior to or post injection. Advised to return to clinic as scheduled      Site:RB    Testerone:100 mg    Delestrogen: 40 mg    CLINIC SUPPLIED MEDICATION

## 2023-10-18 ENCOUNTER — PATIENT MESSAGE (OUTPATIENT)
Dept: OBSTETRICS AND GYNECOLOGY | Facility: CLINIC | Age: 48
End: 2023-10-18
Payer: COMMERCIAL

## 2023-10-18 DIAGNOSIS — Z12.31 VISIT FOR SCREENING MAMMOGRAM: Primary | ICD-10-CM

## 2023-10-19 ENCOUNTER — CLINICAL SUPPORT (OUTPATIENT)
Dept: OBSTETRICS AND GYNECOLOGY | Facility: CLINIC | Age: 48
End: 2023-10-19
Payer: COMMERCIAL

## 2023-10-19 DIAGNOSIS — N95.1 SYMPTOMATIC MENOPAUSAL OR FEMALE CLIMACTERIC STATES: Primary | ICD-10-CM

## 2023-10-19 PROCEDURE — 99999 PR PBB SHADOW E&M-EST. PATIENT-LVL I: ICD-10-PCS | Mod: PBBFAC,,,

## 2023-10-19 PROCEDURE — 99999 PR PBB SHADOW E&M-EST. PATIENT-LVL I: CPT | Mod: PBBFAC,,,

## 2023-10-19 PROCEDURE — 96372 THER/PROPH/DIAG INJ SC/IM: CPT | Mod: S$GLB,,, | Performed by: OBSTETRICS & GYNECOLOGY

## 2023-10-19 PROCEDURE — 96372 PR INJECTION,THERAP/PROPH/DIAG2ST, IM OR SUBCUT: ICD-10-PCS | Mod: S$GLB,,, | Performed by: OBSTETRICS & GYNECOLOGY

## 2023-10-19 RX ADMIN — TESTOSTERONE CYPIONATE 100 MG: 200 INJECTION, SOLUTION INTRAMUSCULAR at 08:10

## 2023-10-19 RX ADMIN — ESTRADIOL VALERATE 40 MG: 40 INJECTION INTRAMUSCULAR at 08:10

## 2023-10-19 NOTE — PROGRESS NOTES
Here for hormone therapy injection, no complaints at this time. Injection given as ordered, tolerated well no reports of pain prior to or post injection. Advised to return to clinic as scheduled      Site:lb     Testerone:100 mg    Delestrogen: 40mg    CLINIC SUPPLIED MEDICATION

## 2023-11-16 ENCOUNTER — CLINICAL SUPPORT (OUTPATIENT)
Dept: OBSTETRICS AND GYNECOLOGY | Facility: CLINIC | Age: 48
End: 2023-11-16
Payer: COMMERCIAL

## 2023-11-16 DIAGNOSIS — N95.1 SYMPTOMATIC MENOPAUSAL OR FEMALE CLIMACTERIC STATES: Primary | ICD-10-CM

## 2023-11-16 PROCEDURE — 96372 PR INJECTION,THERAP/PROPH/DIAG2ST, IM OR SUBCUT: ICD-10-PCS | Mod: S$GLB,,, | Performed by: ORTHOPAEDIC SURGERY

## 2023-11-16 PROCEDURE — 99999 PR PBB SHADOW E&M-EST. PATIENT-LVL I: ICD-10-PCS | Mod: PBBFAC,,,

## 2023-11-16 PROCEDURE — 99999 PR PBB SHADOW E&M-EST. PATIENT-LVL I: CPT | Mod: PBBFAC,,,

## 2023-11-16 PROCEDURE — 96372 THER/PROPH/DIAG INJ SC/IM: CPT | Mod: S$GLB,,, | Performed by: ORTHOPAEDIC SURGERY

## 2023-11-16 RX ORDER — TESTOSTERONE CYPIONATE 200 MG/ML
100 INJECTION, SOLUTION INTRAMUSCULAR
Status: SHIPPED | OUTPATIENT
Start: 2023-11-16 | End: 2024-02-08

## 2023-11-16 RX ORDER — ESTRADIOL VALERATE 40 MG/ML
40 INJECTION INTRAMUSCULAR
Status: SHIPPED | OUTPATIENT
Start: 2023-11-16 | End: 2024-02-14

## 2023-11-16 RX ADMIN — ESTRADIOL VALERATE 40 MG: 40 INJECTION INTRAMUSCULAR at 08:11

## 2023-11-16 RX ADMIN — TESTOSTERONE CYPIONATE 100 MG: 200 INJECTION, SOLUTION INTRAMUSCULAR at 08:11

## 2023-11-21 ENCOUNTER — PATIENT MESSAGE (OUTPATIENT)
Dept: OBSTETRICS AND GYNECOLOGY | Facility: CLINIC | Age: 48
End: 2023-11-21
Payer: COMMERCIAL

## 2023-11-21 DIAGNOSIS — N95.1 SYMPTOMATIC MENOPAUSAL OR FEMALE CLIMACTERIC STATES: Primary | ICD-10-CM

## 2023-12-07 ENCOUNTER — LAB VISIT (OUTPATIENT)
Dept: LAB | Facility: OTHER | Age: 48
End: 2023-12-07
Attending: OBSTETRICS & GYNECOLOGY
Payer: COMMERCIAL

## 2023-12-07 DIAGNOSIS — N95.1 SYMPTOMATIC MENOPAUSAL OR FEMALE CLIMACTERIC STATES: ICD-10-CM

## 2023-12-07 LAB — ESTRADIOL SERPL-MCNC: 347 PG/ML

## 2023-12-07 PROCEDURE — 84402 ASSAY OF FREE TESTOSTERONE: CPT | Performed by: OBSTETRICS & GYNECOLOGY

## 2023-12-07 PROCEDURE — 82670 ASSAY OF TOTAL ESTRADIOL: CPT | Performed by: OBSTETRICS & GYNECOLOGY

## 2023-12-07 PROCEDURE — 36415 COLL VENOUS BLD VENIPUNCTURE: CPT | Performed by: OBSTETRICS & GYNECOLOGY

## 2023-12-11 LAB — TESTOST FREE SERPL-MCNC: 1.2 PG/ML

## 2023-12-15 ENCOUNTER — PATIENT MESSAGE (OUTPATIENT)
Dept: OBSTETRICS AND GYNECOLOGY | Facility: CLINIC | Age: 48
End: 2023-12-15
Payer: COMMERCIAL

## 2024-01-05 ENCOUNTER — HOSPITAL ENCOUNTER (OUTPATIENT)
Dept: RADIOLOGY | Facility: HOSPITAL | Age: 49
Discharge: HOME | End: 2024-01-05
Attending: OBSTETRICS & GYNECOLOGY
Payer: COMMERCIAL

## 2024-01-05 DIAGNOSIS — Z12.31 VISIT FOR SCREENING MAMMOGRAM: ICD-10-CM

## 2024-01-05 PROCEDURE — 77067 SCR MAMMO BI INCL CAD: CPT | Mod: 26,,, | Performed by: RADIOLOGY

## 2024-01-05 PROCEDURE — 77063 BREAST TOMOSYNTHESIS BI: CPT | Mod: 26,,, | Performed by: RADIOLOGY

## 2024-01-05 PROCEDURE — 77067 SCR MAMMO BI INCL CAD: CPT | Mod: TC,PO

## 2024-01-16 ENCOUNTER — PATIENT MESSAGE (OUTPATIENT)
Dept: OBSTETRICS AND GYNECOLOGY | Facility: CLINIC | Age: 49
End: 2024-01-16
Payer: COMMERCIAL

## 2024-01-17 RX ORDER — SPIRONOLACTONE 50 MG/1
50 TABLET, FILM COATED ORAL 2 TIMES DAILY
Qty: 60 TABLET | Refills: 11 | Status: SHIPPED | OUTPATIENT
Start: 2024-01-17 | End: 2025-01-16

## 2024-01-17 NOTE — TELEPHONE ENCOUNTER
Rx Spironolactone sent to pharmacy 50 mg BID to help with hair growth side effect from testosterone injections.

## 2024-01-19 ENCOUNTER — PATIENT MESSAGE (OUTPATIENT)
Dept: OBSTETRICS AND GYNECOLOGY | Facility: CLINIC | Age: 49
End: 2024-01-19
Payer: COMMERCIAL

## 2024-01-25 ENCOUNTER — CLINICAL SUPPORT (OUTPATIENT)
Dept: OBSTETRICS AND GYNECOLOGY | Facility: CLINIC | Age: 49
End: 2024-01-25
Payer: COMMERCIAL

## 2024-01-25 DIAGNOSIS — N95.1 SYMPTOMATIC MENOPAUSAL OR FEMALE CLIMACTERIC STATES: Primary | ICD-10-CM

## 2024-01-25 PROCEDURE — 96372 THER/PROPH/DIAG INJ SC/IM: CPT | Mod: S$GLB,,, | Performed by: OBSTETRICS & GYNECOLOGY

## 2024-01-25 PROCEDURE — 99999 PR PBB SHADOW E&M-EST. PATIENT-LVL I: CPT | Mod: PBBFAC,,,

## 2024-01-25 RX ADMIN — ESTRADIOL VALERATE 40 MG: 40 INJECTION INTRAMUSCULAR at 09:01

## 2024-01-25 RX ADMIN — TESTOSTERONE CYPIONATE 100 MG: 200 INJECTION, SOLUTION INTRAMUSCULAR at 09:01

## 2024-02-22 ENCOUNTER — CLINICAL SUPPORT (OUTPATIENT)
Dept: OBSTETRICS AND GYNECOLOGY | Facility: CLINIC | Age: 49
End: 2024-02-22
Payer: COMMERCIAL

## 2024-02-22 DIAGNOSIS — N95.1 SYMPTOMATIC MENOPAUSAL OR FEMALE CLIMACTERIC STATES: Primary | ICD-10-CM

## 2024-02-22 PROCEDURE — 99999 PR PBB SHADOW E&M-EST. PATIENT-LVL I: CPT | Mod: PBBFAC,,,

## 2024-02-22 PROCEDURE — 96372 THER/PROPH/DIAG INJ SC/IM: CPT | Mod: S$GLB,,, | Performed by: OBSTETRICS & GYNECOLOGY

## 2024-02-22 RX ORDER — TESTOSTERONE CYPIONATE 200 MG/ML
100 INJECTION, SOLUTION INTRAMUSCULAR
Status: SHIPPED | OUTPATIENT
Start: 2024-02-22 | End: 2024-08-08

## 2024-02-22 RX ORDER — ESTRADIOL VALERATE 40 MG/ML
40 INJECTION INTRAMUSCULAR
Status: SHIPPED | OUTPATIENT
Start: 2024-02-22

## 2024-02-22 RX ADMIN — ESTRADIOL VALERATE 40 MG: 40 INJECTION INTRAMUSCULAR at 09:02

## 2024-02-22 RX ADMIN — TESTOSTERONE CYPIONATE 100 MG: 200 INJECTION, SOLUTION INTRAMUSCULAR at 09:02

## 2024-02-22 NOTE — PROGRESS NOTES
Here for hormone therapy injection, no complaints at this time, Injection given as ordered, tolerated well, no report of pain prior to or after injection. Return to clinic as scheduled.     Site - RB    Testosterone  100 mg  Delestrogen 40 mg    Clinic Supplied Medication

## 2024-03-18 ENCOUNTER — PATIENT MESSAGE (OUTPATIENT)
Dept: OBSTETRICS AND GYNECOLOGY | Facility: CLINIC | Age: 49
End: 2024-03-18
Payer: COMMERCIAL

## 2024-03-18 DIAGNOSIS — E28.39 PREMATURE OVARIAN FAILURE: ICD-10-CM

## 2024-03-18 DIAGNOSIS — N95.1 SYMPTOMATIC MENOPAUSAL OR FEMALE CLIMACTERIC STATES: Primary | ICD-10-CM

## 2024-03-19 ENCOUNTER — TELEPHONE (OUTPATIENT)
Dept: OBSTETRICS AND GYNECOLOGY | Facility: CLINIC | Age: 49
End: 2024-03-19
Payer: COMMERCIAL

## 2024-03-20 ENCOUNTER — LAB VISIT (OUTPATIENT)
Dept: LAB | Facility: HOSPITAL | Age: 49
End: 2024-03-20
Attending: OBSTETRICS & GYNECOLOGY
Payer: COMMERCIAL

## 2024-03-20 DIAGNOSIS — E28.39 PREMATURE OVARIAN FAILURE: ICD-10-CM

## 2024-03-20 DIAGNOSIS — N95.1 SYMPTOMATIC MENOPAUSAL OR FEMALE CLIMACTERIC STATES: ICD-10-CM

## 2024-03-20 LAB — ESTRADIOL SERPL-MCNC: 206 PG/ML

## 2024-03-20 PROCEDURE — 82670 ASSAY OF TOTAL ESTRADIOL: CPT | Performed by: OBSTETRICS & GYNECOLOGY

## 2024-03-20 PROCEDURE — 36415 COLL VENOUS BLD VENIPUNCTURE: CPT | Performed by: OBSTETRICS & GYNECOLOGY

## 2024-03-20 PROCEDURE — 84402 ASSAY OF FREE TESTOSTERONE: CPT | Performed by: OBSTETRICS & GYNECOLOGY

## 2024-03-22 LAB — TESTOST FREE SERPL-MCNC: 1.1 PG/ML

## 2024-03-25 ENCOUNTER — TELEPHONE (OUTPATIENT)
Dept: OBSTETRICS AND GYNECOLOGY | Facility: CLINIC | Age: 49
End: 2024-03-25
Payer: COMMERCIAL

## 2024-03-25 NOTE — TELEPHONE ENCOUNTER
----- Message from Shannon Fowler MD sent at 3/25/2024 11:21 AM CDT -----  Did you tell me she wanted to go back on pellets?? If so, go ahead and order and schedule her for pellets:    Testosterone 100 mg x 2  Estradiol 50 mg x 1, 25 mg x 1

## 2024-04-12 ENCOUNTER — TELEPHONE (OUTPATIENT)
Dept: OBSTETRICS AND GYNECOLOGY | Facility: CLINIC | Age: 49
End: 2024-04-12
Payer: COMMERCIAL

## 2024-04-12 NOTE — TELEPHONE ENCOUNTER
Spoke to pt and gave number to pellet pharmacy she asked to also my armidasner message to her also

## 2024-04-16 ENCOUNTER — OFFICE VISIT (OUTPATIENT)
Dept: OBSTETRICS AND GYNECOLOGY | Facility: CLINIC | Age: 49
End: 2024-04-16
Payer: COMMERCIAL

## 2024-04-16 ENCOUNTER — PROCEDURE VISIT (OUTPATIENT)
Dept: OBSTETRICS AND GYNECOLOGY | Facility: CLINIC | Age: 49
End: 2024-04-16
Payer: COMMERCIAL

## 2024-04-16 DIAGNOSIS — N95.1 SYMPTOMATIC MENOPAUSAL OR FEMALE CLIMACTERIC STATES: Primary | ICD-10-CM

## 2024-04-16 PROCEDURE — 99213 OFFICE O/P EST LOW 20 MIN: CPT | Mod: 25,S$GLB,, | Performed by: OBSTETRICS & GYNECOLOGY

## 2024-04-16 PROCEDURE — 11980 IMPLANT HORMONE PELLET(S): CPT | Mod: CSM,S$GLB,, | Performed by: OBSTETRICS & GYNECOLOGY

## 2024-04-16 NOTE — PROGRESS NOTES
HPI: Patient is a 48 year old female who presents today for hormone pellet placement. She reports she is doing well with pellet therapy and would like to continue.     She DID have a hysterectomy.      She has signed her informed consent outlining the procedure and the implant of the pellets . Pt is also aware of the risks and benefits of estradiol and testosterone and that this mode of delivery is not FDA approved even though the actual drug is FDA approved.     She is up to date with her PCP and labs, due for her annual cmp and lipid profile.     No prior cardiac history. She denies the following contraindications to HRT:  Vaginal bleeding, history of VTE/PE, thrombophilia,  breast cancer, or active liver disease.           Last pellets placed: 2022: Has been doing monthly injections but inconvenient which is why she would like to restart pellets.     Most recent labs: 3/20/2024    Free Testosterone: 1.1                          Estradiol:206      Diagnosis: Menopause    Plan:    - Pellet insertion today        Shannon Fowler

## 2024-04-17 NOTE — PROCEDURES
Pellet and Injection    Chief Complaint: Hormone Deficiency    History of Present Illness: 48 y.o. female patient presents with menopausal symptoms.    Interval Changes: adequate response to the pellets    Procedure: Subcutaneous hormone pellet implantation with local anesthesia of 10 cc Xylocaine 1%.    Site: Buttocks    Sutures Used: Yes    Pellets Inserted:  Testosterone 100 mg x 2  Estradiol 50 mg x 1, 25 mg x 1  In the left Buttocks    Intramuscular Injection: None    Follow up: labs booked in 3 months to check levels.     Shannon Fowler MD 04/16/2024 9:36 PM

## 2024-07-17 ENCOUNTER — LAB VISIT (OUTPATIENT)
Dept: LAB | Facility: HOSPITAL | Age: 49
End: 2024-07-17
Attending: OBSTETRICS & GYNECOLOGY
Payer: COMMERCIAL

## 2024-07-17 DIAGNOSIS — N95.1 SYMPTOMATIC MENOPAUSAL OR FEMALE CLIMACTERIC STATES: ICD-10-CM

## 2024-07-17 LAB — ESTRADIOL SERPL-MCNC: 195 PG/ML

## 2024-07-17 PROCEDURE — 82670 ASSAY OF TOTAL ESTRADIOL: CPT | Performed by: OBSTETRICS & GYNECOLOGY

## 2024-07-17 PROCEDURE — 84402 ASSAY OF FREE TESTOSTERONE: CPT | Performed by: OBSTETRICS & GYNECOLOGY

## 2024-07-19 LAB — TESTOST FREE SERPL-MCNC: 1.8 PG/ML

## 2025-01-06 ENCOUNTER — PATIENT MESSAGE (OUTPATIENT)
Dept: OBSTETRICS AND GYNECOLOGY | Facility: CLINIC | Age: 50
End: 2025-01-06
Payer: COMMERCIAL

## 2025-01-06 DIAGNOSIS — Z12.31 VISIT FOR SCREENING MAMMOGRAM: Primary | ICD-10-CM

## 2025-01-14 ENCOUNTER — HOSPITAL ENCOUNTER (OUTPATIENT)
Dept: RADIOLOGY | Facility: OTHER | Age: 50
Discharge: HOME | End: 2025-01-14
Attending: OBSTETRICS & GYNECOLOGY
Payer: COMMERCIAL

## 2025-01-14 DIAGNOSIS — Z12.31 ENCOUNTER FOR SCREENING MAMMOGRAM FOR BREAST CANCER: ICD-10-CM

## 2025-01-14 PROCEDURE — 77067 SCR MAMMO BI INCL CAD: CPT | Mod: 26,,, | Performed by: RADIOLOGY

## 2025-01-14 PROCEDURE — 77063 BREAST TOMOSYNTHESIS BI: CPT | Mod: 26,,, | Performed by: RADIOLOGY

## 2025-01-14 PROCEDURE — 77067 SCR MAMMO BI INCL CAD: CPT | Mod: TC

## 2025-06-19 ENCOUNTER — PATIENT MESSAGE (OUTPATIENT)
Dept: OBSTETRICS AND GYNECOLOGY | Facility: CLINIC | Age: 50
End: 2025-06-19
Payer: COMMERCIAL

## 2025-06-19 DIAGNOSIS — N95.1 SYMPTOMATIC MENOPAUSAL OR FEMALE CLIMACTERIC STATES: Primary | ICD-10-CM

## 2025-06-26 ENCOUNTER — LAB VISIT (OUTPATIENT)
Dept: LAB | Facility: OTHER | Age: 50
End: 2025-06-26
Attending: OBSTETRICS & GYNECOLOGY
Payer: COMMERCIAL

## 2025-06-26 DIAGNOSIS — N95.1 SYMPTOMATIC MENOPAUSAL OR FEMALE CLIMACTERIC STATES: ICD-10-CM

## 2025-06-26 LAB — ESTRADIOL SERPL HS-MCNC: 76 PG/ML

## 2025-06-26 PROCEDURE — 36415 COLL VENOUS BLD VENIPUNCTURE: CPT

## 2025-06-26 PROCEDURE — 84402 ASSAY OF FREE TESTOSTERONE: CPT

## 2025-06-26 PROCEDURE — 82670 ASSAY OF TOTAL ESTRADIOL: CPT

## 2025-06-30 LAB — W FREE TESTOSTERONE: <0.4 PG/ML

## 2025-07-24 ENCOUNTER — OFFICE VISIT (OUTPATIENT)
Dept: OBSTETRICS AND GYNECOLOGY | Facility: CLINIC | Age: 50
End: 2025-07-24
Payer: COMMERCIAL

## 2025-07-24 VITALS
WEIGHT: 180.56 LBS | HEIGHT: 70 IN | DIASTOLIC BLOOD PRESSURE: 60 MMHG | SYSTOLIC BLOOD PRESSURE: 125 MMHG | BODY MASS INDEX: 25.85 KG/M2

## 2025-07-24 DIAGNOSIS — Z90.710 HX OF TOTAL HYSTERECTOMY: ICD-10-CM

## 2025-07-24 DIAGNOSIS — Z01.419 ENCOUNTER FOR GYNECOLOGICAL EXAMINATION: Primary | ICD-10-CM

## 2025-07-24 DIAGNOSIS — E28.39 PREMATURE OVARIAN FAILURE: ICD-10-CM

## 2025-07-24 PROCEDURE — 1159F MED LIST DOCD IN RCRD: CPT | Mod: CPTII,S$GLB,, | Performed by: OBSTETRICS & GYNECOLOGY

## 2025-07-24 PROCEDURE — 99396 PREV VISIT EST AGE 40-64: CPT | Mod: S$GLB,,, | Performed by: OBSTETRICS & GYNECOLOGY

## 2025-07-24 PROCEDURE — 99999 PR PBB SHADOW E&M-EST. PATIENT-LVL III: CPT | Mod: PBBFAC,,, | Performed by: OBSTETRICS & GYNECOLOGY

## 2025-07-24 PROCEDURE — 3078F DIAST BP <80 MM HG: CPT | Mod: CPTII,S$GLB,, | Performed by: OBSTETRICS & GYNECOLOGY

## 2025-07-24 PROCEDURE — 3074F SYST BP LT 130 MM HG: CPT | Mod: CPTII,S$GLB,, | Performed by: OBSTETRICS & GYNECOLOGY

## 2025-07-24 PROCEDURE — 3008F BODY MASS INDEX DOCD: CPT | Mod: CPTII,S$GLB,, | Performed by: OBSTETRICS & GYNECOLOGY

## 2025-07-24 NOTE — PROGRESS NOTES
HPI: Pt is a 49 y.o.  female who presents for routine annual exam. She has POF and is on pellet therapy for hormone replacement. Last pellet insertion 4/16/2024     MMG 1/14/2025 NORMAL; TC 7.19%   Paps no longer indicated.   Colonoscopy 11/16/2020 2 small polyps; 10 year clearance given.       ROS:  GENERAL: Feeling well overall. Denies fever or chills.   SKIN: Denies rash or lesions.   HEAD: Denies head injury or headache.   NODES: Denies enlarged lymph nodes.   CHEST: Denies chest pain or shortness of breath.   CARDIOVASCULAR: Denies palpitations or left sided chest pain.   ABDOMEN: No abdominal pain, constipation, diarrhea, nausea, vomiting or rectal bleeding.   URINARY: No dysuria, hematuria, or burning on urination.  REPRODUCTIVE: See HPI.   BREASTS: Denies pain, lumps, or nipple discharge.   HEMATOLOGIC: No easy bruisability or excessive bleeding.   MUSCULOSKELETAL: Denies joint pain or swelling.   NEUROLOGIC: Denies syncope or weakness.   PSYCHIATRIC: Denies depression, anxiety or mood swings.    PE:   APPEARANCE: Well nourished, well developed, Black or  female in no acute distress.  NODES: no cervical, supraclavicular, or inguinal lymphadenopathy  BREASTS: Symmetrical, no skin changes or visible lesions. No palpable masses, nipple discharge or adenopathy bilaterally.  ABDOMEN: Soft. No tenderness or masses. No distention. No hernias palpated. No CVA tenderness.  VULVA: No lesions. Normal external female genitalia.  URETHRAL MEATUS: Normal size and location, no lesions, no prolapse.  URETHRA: No masses, tenderness, or prolapse.  VAGINA: Moist. No lesions or lacerations noted. No abnormal discharge present. No odor present.   CERVIX: No lesions or discharge. No cervical motion tenderness.   UTERUS: Normal size, regular shape, mobile, non-tender.  ADNEXA: No tenderness. No fullness or masses palpated in the adnexal regions.   ANUS PERINEUM: Normal.      Diagnosis:  1. Encounter for  gynecological examination    2. Premature ovarian failure        Plan:     Orders Placed This Encounter    DXA Bone Density Axial Skeleton 1 or more sites     - Paps no longer indicated  - MMG up to date  - colon cancer screening up to date  - Dexa to check BMD given her hx of POF.     Patient was counseled today on the new ACS guidelines for cervical cytology screening as well as the current recommendations for breast cancer screening. She was counseled to follow up with her PCP for other routine health maintenance. Counseling session lasted approximately 10 minutes, and all her questions were answered.    Follow-up with me in 1 year for routine exam; cotesting in 5 years if today's normal.

## 2025-07-25 ENCOUNTER — HOSPITAL ENCOUNTER (OUTPATIENT)
Dept: RADIOLOGY | Facility: OTHER | Age: 50
Discharge: HOME | End: 2025-07-25
Attending: OBSTETRICS & GYNECOLOGY
Payer: COMMERCIAL

## 2025-07-25 DIAGNOSIS — E28.39 PREMATURE OVARIAN FAILURE: ICD-10-CM

## 2025-07-25 PROCEDURE — 77080 DXA BONE DENSITY AXIAL: CPT | Mod: TC

## 2025-07-25 PROCEDURE — 77080 DXA BONE DENSITY AXIAL: CPT | Mod: 26,,, | Performed by: RADIOLOGY

## 2025-08-12 ENCOUNTER — PROCEDURE VISIT (OUTPATIENT)
Dept: OBSTETRICS AND GYNECOLOGY | Facility: CLINIC | Age: 50
End: 2025-08-12
Payer: COMMERCIAL

## 2025-08-12 ENCOUNTER — OFFICE VISIT (OUTPATIENT)
Dept: OBSTETRICS AND GYNECOLOGY | Facility: CLINIC | Age: 50
End: 2025-08-12
Payer: COMMERCIAL

## 2025-08-12 DIAGNOSIS — E28.39 PREMATURE OVARIAN FAILURE: Primary | ICD-10-CM

## 2025-08-12 DIAGNOSIS — N95.1 SYMPTOMATIC MENOPAUSAL OR FEMALE CLIMACTERIC STATES: ICD-10-CM

## 2025-08-12 DIAGNOSIS — Z90.710 HX OF TOTAL HYSTERECTOMY: ICD-10-CM

## 2025-08-12 PROCEDURE — 11980 IMPLANT HORMONE PELLET(S): CPT | Mod: CSM,S$GLB,, | Performed by: OBSTETRICS & GYNECOLOGY

## (undated) DEVICE — JELLY SURGILUBE 5GR

## (undated) DEVICE — SOL 9P NACL IRR PIC IL

## (undated) DEVICE — FOOTSWITCH SUCTION IRRIGATION

## (undated) DEVICE — TIP RUMI GREEN DISPOSABLE6.7MM

## (undated) DEVICE — CONTAINER SPECIMEN STRL 4OZ

## (undated) DEVICE — SOL STRL WATER INJ 1000ML BG

## (undated) DEVICE — TIP RUMI KOH-EFFICIENT 3.5

## (undated) DEVICE — TROCAR KII FIOS 11MM X 100MM

## (undated) DEVICE — SEALER LIGASURE LAP 37CM 5MM

## (undated) DEVICE — UNDERGLOVE BIOGEL PI SZ 6.5 LF

## (undated) DEVICE — SYR 10CC LUER LOCK

## (undated) DEVICE — SYR 50CC LL

## (undated) DEVICE — PACK LAPAROSCOPY BAPTIST

## (undated) DEVICE — SEE MEDLINE ITEM 153151

## (undated) DEVICE — SOL CLEARIFY VISUALIZATION LAP

## (undated) DEVICE — TROCAR KII FIOS 5MM X 100MM

## (undated) DEVICE — SCISSOR 5MMX35CM DIRECT DRIVE

## (undated) DEVICE — SUT VICRYL PLUS 0 CT1 36IN

## (undated) DEVICE — SPONGE LAP 18X18 PREWASHED

## (undated) DEVICE — NDL INSUF ULTRA VERESS 120MM

## (undated) DEVICE — PAD ABD 8X10 STERILE

## (undated) DEVICE — SEE MEDLINE ITEM 157110

## (undated) DEVICE — SEE MEDLINE ITEM 146313

## (undated) DEVICE — Device

## (undated) DEVICE — SUT MCRYL PLUS 4-0 PS2 27IN

## (undated) DEVICE — SUT 0 8-27IN VICRYL PL CT-1

## (undated) DEVICE — SOL WATER STRL IRR 1000ML

## (undated) DEVICE — DRAPE STERI LONG

## (undated) DEVICE — ELECTRODE ELECSURG LAPSCP SPAT

## (undated) DEVICE — SET TRI-LUMEN FILTERED TUBE

## (undated) DEVICE — SUT EASE CROSSBOW CLSR SYS

## (undated) DEVICE — ELECTRODE NEEDLE 1IN

## (undated) DEVICE — ELECTRODE REM PLYHSV RETURN 9

## (undated) DEVICE — SEE MEDLINE ITEM 156930

## (undated) DEVICE — KIT WING PAD POSITIONING

## (undated) DEVICE — GLOVE BIOGEL SKINSENSE PI 6.5

## (undated) DEVICE — SET CYSTO IRRIGATION UNIV SPIK

## (undated) DEVICE — SOL BETADINE 5%